# Patient Record
Sex: MALE | Race: WHITE | HISPANIC OR LATINO | Employment: FULL TIME | URBAN - METROPOLITAN AREA
[De-identification: names, ages, dates, MRNs, and addresses within clinical notes are randomized per-mention and may not be internally consistent; named-entity substitution may affect disease eponyms.]

---

## 2018-12-14 ENCOUNTER — OFFICE VISIT - HEALTHEAST (OUTPATIENT)
Dept: FAMILY MEDICINE | Facility: CLINIC | Age: 33
End: 2018-12-14

## 2018-12-14 DIAGNOSIS — J01.40 ACUTE NON-RECURRENT PANSINUSITIS: ICD-10-CM

## 2018-12-14 DIAGNOSIS — H66.002 ACUTE SUPPURATIVE OTITIS MEDIA OF LEFT EAR WITHOUT SPONTANEOUS RUPTURE OF TYMPANIC MEMBRANE, RECURRENCE NOT SPECIFIED: ICD-10-CM

## 2018-12-14 DIAGNOSIS — Z23 NEED FOR VACCINATION: ICD-10-CM

## 2021-06-02 VITALS — WEIGHT: 208.44 LBS

## 2021-06-22 NOTE — PROGRESS NOTES
ASSESSMENT/PLAN:       1. Acute non-recurrent pansinusitis    - amoxicillin-clavulanate (AUGMENTIN) 875-125 mg per tablet; Take 1 tablet by mouth 2 (two) times a day for 7 days.  Dispense: 14 tablet; Refill: 0  Other treatment options at home would include steam therapy, drinking lots of fluids and recommended use of nasal saline irrigation.  It is okay for him to use acetaminophen or ibuprofen for fever and discomfort    2. Need for vaccination    - Tdap vaccine,  8yo or older,  IM    3. Acute suppurative otitis media of left ear without spontaneous rupture of tympanic membrane, recurrence not specified  The Augmentin should be helpful to get rid of the ear infection on the left side as well.      Return to clinic if getting worse or not significantly improved in a week    Colin Brown MD      PROGRESS NOTE   12/14/2018    SUBJECTIVE:  Gualberto Farrell is a 33 y.o. male  who presents for   Chief Complaint   Patient presents with     Facial Pain     ran a fever 2 weeks ago, monday started to feel better then on tuesday has had facial pain, teeth pain, ear pain and yellow mucus    #1 respiratory infection 2 weeks duration now with sinus pain  About 2 weeks ago the patient had a sore throat and cough with some runny nose and sneezing.  Had some fever early on and the symptoms seem to be getting better and now over the last 24 hours fever has returned with pain into the upper teeth pain be behind his eyes with loss of taste and yellow nasal drainage.  The patient notices that pain to be worse with bending forward.  He has not had a problem with sinus infections in the past.  His sore throat and cough is resolved but he does have some fullness in the left ear.      There is no problem list on file for this patient.      Current Outpatient Medications   Medication Sig Dispense Refill     amoxicillin-clavulanate (AUGMENTIN) 875-125 mg per tablet Take 1 tablet by mouth 2 (two) times a day for 7 days. 14 tablet 0     No  current facility-administered medications for this visit.        Social History     Tobacco Use   Smoking Status Never Smoker   Smokeless Tobacco Never Used           OBJECTIVE:        No results found for this or any previous visit (from the past 240 hour(s)).    Vitals:    12/14/18 0920   BP: 124/88   Patient Position: Sitting   Cuff Size: Adult Large   Pulse: 87   Temp: 97.9  F (36.6  C)   SpO2: 97%   Weight: 208 lb 7 oz (94.5 kg)     Weight: 208 lb 7 oz (94.5 kg)          Physical Exam:  GENERAL APPEARANCE: 33-year-old male, NAD, well hydrated, well nourished  SKIN:  Normal skin turgor, no lesions/rashes   HEENT: moist mucous membranes,  yellow rhinorrhea with more congestion on the left side than the right.  The left ear is dull pink with loss of landmarks in the right eardrum is normal.  The patient has some tenderness to percussion over the maxillary sinuses.  NECK: Normal without adenopathy or masses  CV: RRR, no M/G/R   LUNGS: CTAB  ABDOMEN: S&NT, no masses or enlarged organs   EXTREMITY: no edema and full ROM of all joints  NEURO: no focal findings

## 2021-08-22 ENCOUNTER — HEALTH MAINTENANCE LETTER (OUTPATIENT)
Age: 36
End: 2021-08-22

## 2021-10-17 ENCOUNTER — HEALTH MAINTENANCE LETTER (OUTPATIENT)
Age: 36
End: 2021-10-17

## 2022-10-01 ENCOUNTER — HEALTH MAINTENANCE LETTER (OUTPATIENT)
Age: 37
End: 2022-10-01

## 2023-10-21 ENCOUNTER — HEALTH MAINTENANCE LETTER (OUTPATIENT)
Age: 38
End: 2023-10-21

## 2024-04-27 ENCOUNTER — HOSPITAL ENCOUNTER (OUTPATIENT)
Facility: CLINIC | Age: 39
Setting detail: OBSERVATION
Discharge: HOME | End: 2024-04-30
Attending: STUDENT IN AN ORGANIZED HEALTH CARE EDUCATION/TRAINING PROGRAM | Admitting: FAMILY MEDICINE
Payer: COMMERCIAL

## 2024-04-27 ENCOUNTER — APPOINTMENT (OUTPATIENT)
Dept: RADIOLOGY | Facility: CLINIC | Age: 39
End: 2024-04-27
Attending: EMERGENCY MEDICINE
Payer: COMMERCIAL

## 2024-04-27 DIAGNOSIS — I31.9 MYOPERICARDITIS: ICD-10-CM

## 2024-04-27 LAB
ATRIAL RATE - MUSE: 68 BPM
ATRIAL RATE - MUSE: 75 BPM
BASOPHILS # BLD MANUAL: 0 10E3/UL (ref 0–0.2)
BASOPHILS NFR BLD MANUAL: 0 %
D DIMER PPP FEU-MCNC: 1.57 UG/ML FEU (ref 0–0.5)
DIASTOLIC BLOOD PRESSURE - MUSE: NORMAL MMHG
DIASTOLIC BLOOD PRESSURE - MUSE: NORMAL MMHG
EOSINOPHIL # BLD MANUAL: 0 10E3/UL (ref 0–0.7)
EOSINOPHIL NFR BLD MANUAL: 0 %
ERYTHROCYTE [DISTWIDTH] IN BLOOD BY AUTOMATED COUNT: 13.7 % (ref 10–15)
HCT VFR BLD AUTO: 47 % (ref 40–53)
HGB BLD-MCNC: 16.5 G/DL (ref 13.3–17.7)
INTERPRETATION ECG - MUSE: NORMAL
INTERPRETATION ECG - MUSE: NORMAL
LYMPHOCYTES # BLD MANUAL: 1.9 10E3/UL (ref 0.8–5.3)
LYMPHOCYTES NFR BLD MANUAL: 18 %
MCH RBC QN AUTO: 29.3 PG (ref 26.5–33)
MCHC RBC AUTO-ENTMCNC: 35.1 G/DL (ref 31.5–36.5)
MCV RBC AUTO: 83 FL (ref 78–100)
MONOCYTES # BLD MANUAL: 1.4 10E3/UL (ref 0–1.3)
MONOCYTES NFR BLD MANUAL: 13 %
NEUTROPHILS # BLD MANUAL: 7.5 10E3/UL (ref 1.6–8.3)
NEUTROPHILS NFR BLD MANUAL: 69 %
NRBC # BLD AUTO: 0 10E3/UL
NRBC BLD AUTO-RTO: 0 /100
P AXIS - MUSE: 46 DEGREES
P AXIS - MUSE: 54 DEGREES
PLAT MORPH BLD: ABNORMAL
PLATELET # BLD AUTO: 270 10E3/UL (ref 150–450)
PR INTERVAL - MUSE: 140 MS
PR INTERVAL - MUSE: 152 MS
QRS DURATION - MUSE: 104 MS
QRS DURATION - MUSE: 108 MS
QT - MUSE: 378 MS
QT - MUSE: 394 MS
QTC - MUSE: 418 MS
QTC - MUSE: 422 MS
R AXIS - MUSE: 268 DEGREES
R AXIS - MUSE: 270 DEGREES
RBC # BLD AUTO: 5.64 10E6/UL (ref 4.4–5.9)
RBC MORPH BLD: ABNORMAL
SYSTOLIC BLOOD PRESSURE - MUSE: NORMAL MMHG
SYSTOLIC BLOOD PRESSURE - MUSE: NORMAL MMHG
T AXIS - MUSE: 50 DEGREES
T AXIS - MUSE: 54 DEGREES
VARIANT LYMPHS BLD QL SMEAR: PRESENT
VENTRICULAR RATE- MUSE: 68 BPM
VENTRICULAR RATE- MUSE: 75 BPM
WBC # BLD AUTO: 10.8 10E3/UL (ref 4–11)

## 2024-04-27 PROCEDURE — 93005 ELECTROCARDIOGRAM TRACING: CPT | Performed by: EMERGENCY MEDICINE

## 2024-04-27 PROCEDURE — 82248 BILIRUBIN DIRECT: CPT | Performed by: EMERGENCY MEDICINE

## 2024-04-27 PROCEDURE — 36415 COLL VENOUS BLD VENIPUNCTURE: CPT | Performed by: EMERGENCY MEDICINE

## 2024-04-27 PROCEDURE — 80053 COMPREHEN METABOLIC PANEL: CPT | Performed by: EMERGENCY MEDICINE

## 2024-04-27 PROCEDURE — 99285 EMERGENCY DEPT VISIT HI MDM: CPT | Mod: 25

## 2024-04-27 PROCEDURE — 71046 X-RAY EXAM CHEST 2 VIEWS: CPT

## 2024-04-27 PROCEDURE — 250N000011 HC RX IP 250 OP 636: Performed by: EMERGENCY MEDICINE

## 2024-04-27 PROCEDURE — 83690 ASSAY OF LIPASE: CPT | Performed by: EMERGENCY MEDICINE

## 2024-04-27 PROCEDURE — 96375 TX/PRO/DX INJ NEW DRUG ADDON: CPT

## 2024-04-27 PROCEDURE — 85379 FIBRIN DEGRADATION QUANT: CPT | Performed by: STUDENT IN AN ORGANIZED HEALTH CARE EDUCATION/TRAINING PROGRAM

## 2024-04-27 PROCEDURE — 85007 BL SMEAR W/DIFF WBC COUNT: CPT | Performed by: EMERGENCY MEDICINE

## 2024-04-27 PROCEDURE — 84484 ASSAY OF TROPONIN QUANT: CPT | Performed by: EMERGENCY MEDICINE

## 2024-04-27 PROCEDURE — 250N000013 HC RX MED GY IP 250 OP 250 PS 637: Performed by: STUDENT IN AN ORGANIZED HEALTH CARE EDUCATION/TRAINING PROGRAM

## 2024-04-27 PROCEDURE — 250N000011 HC RX IP 250 OP 636: Performed by: STUDENT IN AN ORGANIZED HEALTH CARE EDUCATION/TRAINING PROGRAM

## 2024-04-27 PROCEDURE — 85025 COMPLETE CBC W/AUTO DIFF WBC: CPT | Performed by: EMERGENCY MEDICINE

## 2024-04-27 PROCEDURE — 96374 THER/PROPH/DIAG INJ IV PUSH: CPT

## 2024-04-27 PROCEDURE — 83735 ASSAY OF MAGNESIUM: CPT | Performed by: EMERGENCY MEDICINE

## 2024-04-27 RX ORDER — MORPHINE SULFATE 4 MG/ML
4 INJECTION, SOLUTION INTRAMUSCULAR; INTRAVENOUS ONCE
Status: COMPLETED | OUTPATIENT
Start: 2024-04-27 | End: 2024-04-27

## 2024-04-27 RX ORDER — COLCHICINE 0.6 MG/1
0.6 TABLET ORAL ONCE
Status: COMPLETED | OUTPATIENT
Start: 2024-04-28 | End: 2024-04-27

## 2024-04-27 RX ORDER — KETOROLAC TROMETHAMINE 15 MG/ML
15 INJECTION, SOLUTION INTRAMUSCULAR; INTRAVENOUS ONCE
Status: COMPLETED | OUTPATIENT
Start: 2024-04-27 | End: 2024-04-27

## 2024-04-27 RX ORDER — ONDANSETRON 2 MG/ML
4 INJECTION INTRAMUSCULAR; INTRAVENOUS ONCE
Status: COMPLETED | OUTPATIENT
Start: 2024-04-27 | End: 2024-04-27

## 2024-04-27 RX ADMIN — COLCHICINE 0.6 MG: 0.6 TABLET ORAL at 23:59

## 2024-04-27 RX ADMIN — KETOROLAC TROMETHAMINE 15 MG: 15 INJECTION, SOLUTION INTRAMUSCULAR; INTRAVENOUS at 23:30

## 2024-04-27 RX ADMIN — FAMOTIDINE 40 MG: 10 INJECTION, SOLUTION INTRAVENOUS at 23:21

## 2024-04-27 RX ADMIN — MORPHINE SULFATE 4 MG: 4 INJECTION, SOLUTION INTRAMUSCULAR; INTRAVENOUS at 23:32

## 2024-04-27 RX ADMIN — ONDANSETRON 4 MG: 2 INJECTION INTRAMUSCULAR; INTRAVENOUS at 23:29

## 2024-04-27 ASSESSMENT — COLUMBIA-SUICIDE SEVERITY RATING SCALE - C-SSRS
2. HAVE YOU ACTUALLY HAD ANY THOUGHTS OF KILLING YOURSELF IN THE PAST MONTH?: NO
1. IN THE PAST MONTH, HAVE YOU WISHED YOU WERE DEAD OR WISHED YOU COULD GO TO SLEEP AND NOT WAKE UP?: NO
6. HAVE YOU EVER DONE ANYTHING, STARTED TO DO ANYTHING, OR PREPARED TO DO ANYTHING TO END YOUR LIFE?: NO

## 2024-04-27 ASSESSMENT — ACTIVITIES OF DAILY LIVING (ADL): ADLS_ACUITY_SCORE: 35

## 2024-04-27 NOTE — LETTER
[facility] HEART CARE  [address] [address]  Phone: [phone]  Fax: [phone]    April 29, 2024        Gualberto Farrell  [address] [address]          To whom it may concern:    RE: Gualberto Farrell was admitted to [facility] on 4/27/2024 and continues to be hospitalized.      {:[id number]}  Please contact me for questions or concerns.      Sincerely,    Radha Hutchinson RN      [facility], MD N[place]id

## 2024-04-27 NOTE — LETTER
[facility] HEART CARE  [address] [address]  Phone: [phone]  Fax: [phone]    April 30, 2024        Gualberto Farrell  [address] [address]          To whom it may concern:    RE: Gualber[place] Far[place] [facility] to April 30th, 2024.    Patient may return to work Monday May 6th, 2024    Please contact me for questions or concerns.      Sincerely,    Brandy Paulson RN, MD, MD

## 2024-04-27 NOTE — LETTER
[facility] HEART CARE  [address] [address]  Phone: [phone]  Fax: [phone]    April 30, 2024        Gualberto Farrell  [address] [address]          To whom it may concern:    RE: Gualberto Farrell    Patient may return to work *** with the following:  {No Restrictions or Light Duty List:[id number]}    Please contact me for questions or concerns.      Sincerely,      Brandy Haque MD, MD

## 2024-04-28 ENCOUNTER — APPOINTMENT (OUTPATIENT)
Dept: CARDIOLOGY | Facility: CLINIC | Age: 39
End: 2024-04-28
Attending: INTERNAL MEDICINE
Payer: COMMERCIAL

## 2024-04-28 ENCOUNTER — APPOINTMENT (OUTPATIENT)
Dept: CT IMAGING | Facility: CLINIC | Age: 39
End: 2024-04-28
Attending: STUDENT IN AN ORGANIZED HEALTH CARE EDUCATION/TRAINING PROGRAM
Payer: COMMERCIAL

## 2024-04-28 PROBLEM — I31.9 MYOPERICARDITIS: Status: ACTIVE | Noted: 2024-04-28

## 2024-04-28 LAB
ALBUMIN SERPL BCG-MCNC: 3.7 G/DL (ref 3.5–5.2)
ALBUMIN SERPL BCG-MCNC: 4.3 G/DL (ref 3.5–5.2)
ALBUMIN UR-MCNC: 100 MG/DL
ALP SERPL-CCNC: 81 U/L (ref 40–150)
ALP SERPL-CCNC: 95 U/L (ref 40–150)
ALT SERPL W P-5'-P-CCNC: 58 U/L (ref 0–70)
ALT SERPL W P-5'-P-CCNC: 61 U/L (ref 0–70)
AMORPH CRY #/AREA URNS HPF: ABNORMAL /HPF
ANION GAP SERPL CALCULATED.3IONS-SCNC: 15 MMOL/L (ref 7–15)
APPEARANCE UR: ABNORMAL
AST SERPL W P-5'-P-CCNC: 144 U/L (ref 0–45)
AST SERPL W P-5'-P-CCNC: 180 U/L (ref 0–45)
BILIRUB DIRECT SERPL-MCNC: <0.2 MG/DL (ref 0–0.3)
BILIRUB DIRECT SERPL-MCNC: <0.2 MG/DL (ref 0–0.3)
BILIRUB SERPL-MCNC: 0.5 MG/DL
BILIRUB SERPL-MCNC: 0.7 MG/DL
BILIRUB UR QL STRIP: NEGATIVE
BUN SERPL-MCNC: 10.6 MG/DL (ref 6–20)
CALCIUM SERPL-MCNC: 9.2 MG/DL (ref 8.6–10)
CHLORIDE SERPL-SCNC: 99 MMOL/L (ref 98–107)
COLOR UR AUTO: YELLOW
CREAT SERPL-MCNC: 0.89 MG/DL (ref 0.67–1.17)
DEPRECATED HCO3 PLAS-SCNC: 24 MMOL/L (ref 22–29)
EGFRCR SERPLBLD CKD-EPI 2021: >90 ML/MIN/1.73M2
FLUAV RNA SPEC QL NAA+PROBE: NEGATIVE
FLUBV RNA RESP QL NAA+PROBE: NEGATIVE
GLUCOSE SERPL-MCNC: 147 MG/DL (ref 70–99)
GLUCOSE UR STRIP-MCNC: NEGATIVE MG/DL
HGB UR QL STRIP: ABNORMAL
KETONES UR STRIP-MCNC: 40 MG/DL
LEUKOCYTE ESTERASE UR QL STRIP: NEGATIVE
LIPASE SERPL-CCNC: 28 U/L (ref 13–60)
LVEF ECHO: NORMAL
MAGNESIUM SERPL-MCNC: 2.5 MG/DL (ref 1.7–2.3)
MUCOUS THREADS #/AREA URNS LPF: PRESENT /LPF
NITRATE UR QL: NEGATIVE
PH UR STRIP: 6 [PH] (ref 5–7)
POTASSIUM SERPL-SCNC: 3.4 MMOL/L (ref 3.4–5.3)
PROT SERPL-MCNC: 7 G/DL (ref 6.4–8.3)
PROT SERPL-MCNC: 8 G/DL (ref 6.4–8.3)
RBC URINE: 2 /HPF
RSV RNA SPEC NAA+PROBE: NEGATIVE
SARS-COV-2 RNA RESP QL NAA+PROBE: NEGATIVE
SODIUM SERPL-SCNC: 138 MMOL/L (ref 135–145)
SP GR UR STRIP: 1.03 (ref 1–1.03)
SQUAMOUS EPITHELIAL: <1 /HPF
TROPONIN T SERPL HS-MCNC: 1845 NG/L
TROPONIN T SERPL HS-MCNC: 3369 NG/L
TROPONIN T SERPL HS-MCNC: 3611 NG/L
TROPONIN T SERPL HS-MCNC: 3619 NG/L
UROBILINOGEN UR STRIP-MCNC: <2 MG/DL
WBC URINE: 2 /HPF

## 2024-04-28 PROCEDURE — 99207 PR NO BILLABLE SERVICE THIS VISIT: CPT | Performed by: FAMILY MEDICINE

## 2024-04-28 PROCEDURE — 250N000013 HC RX MED GY IP 250 OP 250 PS 637: Performed by: INTERNAL MEDICINE

## 2024-04-28 PROCEDURE — 36415 COLL VENOUS BLD VENIPUNCTURE: CPT | Performed by: STUDENT IN AN ORGANIZED HEALTH CARE EDUCATION/TRAINING PROGRAM

## 2024-04-28 PROCEDURE — 99222 1ST HOSP IP/OBS MODERATE 55: CPT | Performed by: INTERNAL MEDICINE

## 2024-04-28 PROCEDURE — 84484 ASSAY OF TROPONIN QUANT: CPT | Performed by: STUDENT IN AN ORGANIZED HEALTH CARE EDUCATION/TRAINING PROGRAM

## 2024-04-28 PROCEDURE — 71275 CT ANGIOGRAPHY CHEST: CPT

## 2024-04-28 PROCEDURE — 258N000003 HC RX IP 258 OP 636: Performed by: STUDENT IN AN ORGANIZED HEALTH CARE EDUCATION/TRAINING PROGRAM

## 2024-04-28 PROCEDURE — 36415 COLL VENOUS BLD VENIPUNCTURE: CPT | Performed by: FAMILY MEDICINE

## 2024-04-28 PROCEDURE — 96376 TX/PRO/DX INJ SAME DRUG ADON: CPT

## 2024-04-28 PROCEDURE — 80076 HEPATIC FUNCTION PANEL: CPT | Performed by: FAMILY MEDICINE

## 2024-04-28 PROCEDURE — 81001 URINALYSIS AUTO W/SCOPE: CPT | Performed by: STUDENT IN AN ORGANIZED HEALTH CARE EDUCATION/TRAINING PROGRAM

## 2024-04-28 PROCEDURE — 99204 OFFICE O/P NEW MOD 45 MIN: CPT | Mod: 25 | Performed by: HOSPITALIST

## 2024-04-28 PROCEDURE — G0378 HOSPITAL OBSERVATION PER HR: HCPCS

## 2024-04-28 PROCEDURE — 93306 TTE W/DOPPLER COMPLETE: CPT

## 2024-04-28 PROCEDURE — 250N000013 HC RX MED GY IP 250 OP 250 PS 637: Performed by: HOSPITALIST

## 2024-04-28 PROCEDURE — 250N000011 HC RX IP 250 OP 636: Performed by: INTERNAL MEDICINE

## 2024-04-28 PROCEDURE — 84484 ASSAY OF TROPONIN QUANT: CPT | Performed by: FAMILY MEDICINE

## 2024-04-28 PROCEDURE — 87637 SARSCOV2&INF A&B&RSV AMP PRB: CPT | Performed by: HOSPITALIST

## 2024-04-28 PROCEDURE — 93306 TTE W/DOPPLER COMPLETE: CPT | Mod: 26 | Performed by: INTERNAL MEDICINE

## 2024-04-28 PROCEDURE — 96361 HYDRATE IV INFUSION ADD-ON: CPT

## 2024-04-28 PROCEDURE — 250N000011 HC RX IP 250 OP 636: Performed by: STUDENT IN AN ORGANIZED HEALTH CARE EDUCATION/TRAINING PROGRAM

## 2024-04-28 RX ORDER — PROCHLORPERAZINE MALEATE 10 MG
10 TABLET ORAL EVERY 6 HOURS PRN
Status: DISCONTINUED | OUTPATIENT
Start: 2024-04-28 | End: 2024-04-30 | Stop reason: HOSPADM

## 2024-04-28 RX ORDER — AMOXICILLIN 250 MG
1 CAPSULE ORAL 2 TIMES DAILY PRN
Status: DISCONTINUED | OUTPATIENT
Start: 2024-04-28 | End: 2024-04-30 | Stop reason: HOSPADM

## 2024-04-28 RX ORDER — COLCHICINE 0.6 MG/1
0.6 TABLET ORAL 2 TIMES DAILY
Status: DISCONTINUED | OUTPATIENT
Start: 2024-04-28 | End: 2024-04-30 | Stop reason: HOSPADM

## 2024-04-28 RX ORDER — IBUPROFEN 600 MG/1
600 TABLET, FILM COATED ORAL
Status: DISCONTINUED | OUTPATIENT
Start: 2024-04-28 | End: 2024-04-30 | Stop reason: HOSPADM

## 2024-04-28 RX ORDER — ACETAMINOPHEN 325 MG/1
650 TABLET ORAL EVERY 4 HOURS PRN
Status: DISCONTINUED | OUTPATIENT
Start: 2024-04-28 | End: 2024-04-30 | Stop reason: HOSPADM

## 2024-04-28 RX ORDER — ACETAMINOPHEN 650 MG/1
650 SUPPOSITORY RECTAL EVERY 4 HOURS PRN
Status: DISCONTINUED | OUTPATIENT
Start: 2024-04-28 | End: 2024-04-30 | Stop reason: HOSPADM

## 2024-04-28 RX ORDER — KETOROLAC TROMETHAMINE 30 MG/ML
30 INJECTION, SOLUTION INTRAMUSCULAR; INTRAVENOUS EVERY 6 HOURS PRN
Status: DISCONTINUED | OUTPATIENT
Start: 2024-04-28 | End: 2024-04-28

## 2024-04-28 RX ORDER — PROCHLORPERAZINE 25 MG
25 SUPPOSITORY, RECTAL RECTAL EVERY 12 HOURS PRN
Status: DISCONTINUED | OUTPATIENT
Start: 2024-04-28 | End: 2024-04-30 | Stop reason: HOSPADM

## 2024-04-28 RX ORDER — ONDANSETRON 2 MG/ML
4 INJECTION INTRAMUSCULAR; INTRAVENOUS EVERY 6 HOURS PRN
Status: DISCONTINUED | OUTPATIENT
Start: 2024-04-28 | End: 2024-04-30 | Stop reason: HOSPADM

## 2024-04-28 RX ORDER — ONDANSETRON 4 MG/1
4 TABLET, ORALLY DISINTEGRATING ORAL EVERY 6 HOURS PRN
Status: DISCONTINUED | OUTPATIENT
Start: 2024-04-28 | End: 2024-04-30 | Stop reason: HOSPADM

## 2024-04-28 RX ORDER — IOPAMIDOL 755 MG/ML
75 INJECTION, SOLUTION INTRAVASCULAR ONCE
Status: COMPLETED | OUTPATIENT
Start: 2024-04-28 | End: 2024-04-28

## 2024-04-28 RX ORDER — KETOROLAC TROMETHAMINE 15 MG/ML
15 INJECTION, SOLUTION INTRAMUSCULAR; INTRAVENOUS EVERY 6 HOURS PRN
Status: DISPENSED | OUTPATIENT
Start: 2024-04-28 | End: 2024-04-29

## 2024-04-28 RX ORDER — AMOXICILLIN 250 MG
2 CAPSULE ORAL 2 TIMES DAILY PRN
Status: DISCONTINUED | OUTPATIENT
Start: 2024-04-28 | End: 2024-04-30 | Stop reason: HOSPADM

## 2024-04-28 RX ORDER — MORPHINE SULFATE 4 MG/ML
3 INJECTION, SOLUTION INTRAMUSCULAR; INTRAVENOUS
Status: COMPLETED | OUTPATIENT
Start: 2024-04-28 | End: 2024-04-28

## 2024-04-28 RX ORDER — COLCHICINE 0.6 MG/1
0.6 TABLET ORAL 2 TIMES DAILY
Status: DISCONTINUED | OUTPATIENT
Start: 2024-04-28 | End: 2024-04-28

## 2024-04-28 RX ORDER — ACETAMINOPHEN 500 MG
500-1000 TABLET ORAL EVERY 6 HOURS PRN
COMMUNITY

## 2024-04-28 RX ORDER — COLCHICINE 0.6 MG/1
1.2 TABLET ORAL ONCE
Status: DISCONTINUED | OUTPATIENT
Start: 2024-04-28 | End: 2024-04-28

## 2024-04-28 RX ADMIN — IBUPROFEN 600 MG: 600 TABLET ORAL at 18:50

## 2024-04-28 RX ADMIN — MORPHINE SULFATE 3 MG: 4 INJECTION, SOLUTION INTRAMUSCULAR; INTRAVENOUS at 22:14

## 2024-04-28 RX ADMIN — IOPAMIDOL 75 ML: 755 INJECTION, SOLUTION INTRAVENOUS at 00:55

## 2024-04-28 RX ADMIN — SODIUM CHLORIDE, POTASSIUM CHLORIDE, SODIUM LACTATE AND CALCIUM CHLORIDE 1000 ML: 600; 310; 30; 20 INJECTION, SOLUTION INTRAVENOUS at 00:06

## 2024-04-28 RX ADMIN — COLCHICINE 0.6 MG: 0.6 TABLET ORAL at 08:10

## 2024-04-28 RX ADMIN — ACETAMINOPHEN 650 MG: 325 TABLET ORAL at 21:00

## 2024-04-28 ASSESSMENT — ACTIVITIES OF DAILY LIVING (ADL)
ADLS_ACUITY_SCORE: 35
ADLS_ACUITY_SCORE: 18
ADLS_ACUITY_SCORE: 18
ADLS_ACUITY_SCORE: 35
ADLS_ACUITY_SCORE: 18
ADLS_ACUITY_SCORE: 35
ADLS_ACUITY_SCORE: 35
ADLS_ACUITY_SCORE: 18
ADLS_ACUITY_SCORE: 35
ADLS_ACUITY_SCORE: 18
ADLS_ACUITY_SCORE: 18
ADLS_ACUITY_SCORE: 35
ADLS_ACUITY_SCORE: 18
ADLS_ACUITY_SCORE: 35

## 2024-04-28 NOTE — CONSULTS
[facility] Heart Care team is asked to see Gualberto Farrell in consultation to evaluate elevated trop 3611 .      Assessment:     Acute onset chest pain in 38-year-old gentleman with no cardiac risk factors, recent episode of fevers chills diarrhea, and ECG findings consistent with acute pericarditis.  Elevated troponin suggest myocardial involvement.  Good response to colchicine from a pain control standpoint, but onset of diarrhea suggests he may not tolerate it well long-term.  Will try ibuprofen 600 mg 3 times daily with meals  Myocarditis suggested by elevated troponin.  No clinical scenario to suggest acute coronary syndrome.  Likely viral etiology.  Normal ventricular function demonstrated on echo.     Plan:     Echocardiogram today shows no evidence of pericardial effusion, normal ventricular function and no significant valvular abnormalities.  Colchicine 0.6 mg twice daily for 1 month at least with gradual taper if tolerated.  Will decrease to once a day for the time being and give a trial of ibuprofen 600 mg 3 times daily with meals.    Stable for discharge in a.m. if pain controlled and diarrhea controlled     Current History:     Gualberto Farrell is a 38-year-old gentleman on no regular medications.  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  He also works 2 jobs which has been limiting his sleep.  1 week ago he ate some food that he believes then provoked diarrhea.  He had recurrent bouts of diarrhea over the next several days when he would occasionally eat suspected tainted food.  His wife also developed diarrhea during this timeframe.  He had no fevers chills or night sweats.  He felt weak, and had to call off of work.  He was able to help move a refrigerator on the 24th but felt very fatigued doing so.  It was not long after that he began to experience some upper substernal chest pain, worsened with breathing or with movement.  It resolved spontaneously at  "that time.  Yesterday this chest pain recurred, pressure sensation did not worsen with limited activity but breathing or movement would make it worse.  He was not short of breath or diaphoretic or nauseated.  He was still having intermittent bouts of diarrhea.  The chest discomfort increased in intensity causing him to the present to the emergency room.  In the emergency room ECG was suggestive of acute pericarditis.  He received colchicine with immediate improvement in his chest pain, with his second dose today he began experiencing watery diarrhea which is different from the other diarrhea he was experiencing.    Past Medical History:   No past medical history on file.  Sleep history: Limited by multiple jobs.  Good sleep last evening after hospitalization.  No snoring or apnea noted  Past Surgical History:   No past surgical history on file.    Family History:   No family history on file.  Family history reviewed and is not pertinent to the chief complaint or presenting problem    Social History:    reports that he has never smoked. He has never used smokeless tobacco. He reports current alcohol use of about 3.0 standard drinks of alcohol per week. He reports that he does not use drugs.  Exercise history: Heavy physical labor, stable activity tolerance.  Has a gym membership but has not had a chance to use it.    Meds:     No current outpatient medications on file.       Allergies:   Patient has no known allergies.    Review of Systems:   A 12 point comprehensive review of systems was negative except as noted in the current history.    Objective:      Physical Exam  Wt Readings from Last 3 Encounters:   04/27/24 101.2 kg (223 lb)   12/14/18 94.5 kg (208 lb 7 oz)     Body mass index is 32.93 kg/m .  BP 96/69   Pulse 85   Temp 98.9  F (37.2  C) (Oral)   Resp 10   Ht 1.753 m (5' 9\")   Wt 101.2 kg (223 lb)   SpO2 96%   BMI 32.93 kg/m      General Appearance:  No apparent distress.  Comfortable supine.  HEENT: " "No scleral icterus; the mucous membranes were pink and moist.  Conjunctiva not injected  Neck:  No cervical bruits, jugular venous distention, or thyromegaly.  Chest:The spine was straight. The chest was symmetric.  Lungs:  Respirations unlabored; the lungs are clear to auscultation.  Cardiovascular:     Normal point of maximal impulse. Auscultation reveals regular first and second heart sounds with no murmurs, rubs, or gallops. Carotid, radial, and dorsalis pedal pulses are intact and symmetric.  Abdomen:  No organomegaly, masses, bruits, or tenderness. Bowels sounds are present  Extremities: No cyanosis, clubbing, or edema.  Skin:  No xanthelasma.  Neurologic: Mood and affect are appropriate. Speech is fluent.      EKG (personally reviewed): 4/27/2024: Sinus rhythm 68 bpm.  Left anterior hemiblock.  Acute pericarditis with diffuse ST elevation.  Repeat study without significant change.    Imaging   CT chest PE run 4/27:  CORONARY ARTERY CALCIFICATION: None.  1.  No evidence of pulmonary embolus.      Cardiac diagnostics   Echocardiogram today  Left ventricular size, wall motion and function are normal. The ejection  fraction is 60-65%.  Normal right ventricle size and systolic function.  There is no pericardial effusion.  No hemodynamically significant valvular abnormalities on 2D or color flow  imaging.         Lab Results    Chemistry/lipid CBC Cardiac Enzymes/BNP/TSH/INR   No results for input(s): \"CHOL\", \"HDL\", \"LDL\", \"TRIG\", \"CHOLHDLRATIO\" in the last 85030 hours.  No results for input(s): \"LDL\" in the last 74315 hours.  Recent Labs   Lab Test 04/27/24  2323      POTASSIUM 3.4   CHLORIDE 99   CO2 24   *   BUN 10.6   CR 0.89   GFRESTIMATED >90   JULIANE 9.2     Recent Labs   Lab Test 04/27/24  2323   CR 0.89     No results for input(s): \"A1C\" in the last 83147 hours.       Recent Labs   Lab Test 04/27/24  2323   WBC 10.8   HGB 16.5   HCT 47.0   MCV 83        Recent Labs   Lab Test " "04/27/24  2323   HGB 16.5    No results for input(s): \"TROPONINI\" in the last 34483 hours.  No results for input(s): \"BNP\", \"NTBNPI\", \"NTBNP\" in the last 06936 hours.  No results for input(s): \"TSH\" in the last 25090 hours.  No results for input(s): \"INR\" in the last 90154 hours.         Weston Lang MD  [facility]  4/28/2024    Note created using Dragon voice recognition software.  Sound alike errors may have escaped editing.    Clinically Significant Risk Factors Present on Admission                  # Obesity: Estimated body mass index is 32.93 kg/m  as calculated from the following:    Height as of this encounter: 1.753 m (5' 9\").    Weight as of this encounter: 101.2 kg (223 lb).                                  "

## 2024-04-28 NOTE — PROGRESS NOTES
Patient is A&O X4, has been having soft BP's, other VSS  on RA. Denies pain, SOB, chest tightness, dizziness, constipation or nausea. Q2H neurochecks done. CMS intact. No pronator drift, Dorsiflexion good.   TELE reads Sinus Rhythm with nondescript T waves  DIET: Low fat, No caffeine  GI/: Had complained of difficulty urinating in the beginning of shift, he was bladder scanned with <70 ml found. Since midnight, he has been able to void spontaneously without difficulty.  Calls appropriately, ambulates standby to the bathroom. Wife is in room, spent the night.

## 2024-04-28 NOTE — ED TRIAGE NOTES
"Pt presents to ED with c/o chest pain x2 days.  Had \"food poisoning\" on Wednesday.  Reports fever x2 days.  Feeling short of breath.  Decreased urinary output.       Triage Assessment (Adult)       Row Name 04/27/24 7124          Triage Assessment    Airway WDL WDL        Respiratory WDL    Respiratory WDL X;rhythm/pattern     Rhythm/Pattern, Respiratory shortness of breath        Skin Circulation/Temperature WDL    Skin Circulation/Temperature WDL WDL        Cardiac WDL    Cardiac WDL X;chest pain        Chest Pain Assessment    Chest Pain Location midsternal        Peripheral/Neurovascular WDL    Peripheral Neurovascular WDL WDL        Cognitive/Neuro/Behavioral WDL    Cognitive/Neuro/Behavioral WDL WDL                     "

## 2024-04-28 NOTE — ED NOTES
Patient denies chest pain, shortness of breath states he is feeling much better since starting Colchicine.   Nelson Toth RN  4/28/2024  7:46 AM

## 2024-04-28 NOTE — ED PROVIDER NOTES
"  Emergency Department Encounter         FINAL IMPRESSION:  Myopericarditis         ED COURSE AND MEDICAL DECISION MAKING       ED Course as of 04/28/24 0134   Sat Apr 27, 2024   2309 EKG #1 sinus rhythm 75, no signs acute ischemia, no inversions no depressions no STEMI QTc is 422    EKG #2 as patient stated he started getting diaphoretic with chest pain,   2332 Discussed with cardiology who agrees that this EKG does suggest pericarditis.  Recommending NSAIDs and colchicine.   2349 Reevaluation, patient states his pain is now 2 out of 10.  Reports as well that he has been having difficulty urination.  Will do a bladder scan.   2350 Patient is a 2-year-old male with no chronic medical problems, here from home with anterior chest discomfort that began around 2 hours ago.  Patient states on Wednesday of this week he had an episode of \"food poisoning\" stating that he had some nausea, decreased p.o. and multiple episodes of diarrhea.  Intermittent subjective fevers.  States the symptoms improved however had an episode of chest pain yesterday the last 1 hour and now chest pain today.  No radiation to the back or abdomen.  No syncope.  Patient states when the pain gets bad he gets diaphoretic and nauseated.  Intermittent shortness of breath.  On arrival here he looks uncomfortable although nontoxic.  Vitals are stable.  Heart and lungs are normal.  Bedside echocardiogram showing no significant pericardial effusion.  EKGs as above.  Discussed case with cardiology.   2356 Bladder scan 67.  Added on urinalysis for dysuria.   -Plan for admission.  Discussed case with hospitalist.    Additional ED Course Timeline:  11:11 PM I met with the patient, obtained history, performed an initial exam, and discussed options and plan for diagnostics and treatment here in the ED.   11:21 PM I spoke with Dr. Fields, Cardiology  1:35 AM I Spoke with Dr. Anand, Hospitalist. We further discussed the patient's case, reviewed ED work-up so far, and " agreed to admit the patient.      Medical Decision Making  Obtained supplemental history:Supplemental history obtained?: Documented in chart  Reviewed external records: External records reviewed?: Documented in chart  Care impacted by chronic illness:N/A  Care significantly affected by social determinants of health:Access to Medical Care  Did you consider but not order tests?: Work up considered but not performed and documented in chart, if applicable  Did you interpret images independently?: Independent interpretation of ECG and images noted in documentation, when applicable.  Consultation discussion with other provider:Did you involve another provider (consultant, , pharmacy, etc.)?: I discussed the care with another health care provider, see documentation for details.  Admit.              Critical Care     Performed by: Quirino Mcdaniels or    Authorized by: Quirino Mcdaniels  Total critical care time:  minutes  Critical care was necessary to treat or prevent imminent or life-threatening deterioration of the following conditions:   Critical care was time spent personally by me on the following activities: development of treatment plan with patient or surrogate, discussions with consultants, examination of patient, evaluation of patient's response to treatment, obtaining history from patient or surrogate, ordering and performing treatments and interventions, ordering and review of laboratory studies, ordering and review of radiographic studies, re-evaluation of patient's condition and monitoring for potential decompensation.  Critical care time was exclusive of separately billable procedures and treating other patients.'    At the conclusion of the encounter I discussed the results of all the tests and the disposition. The questions were answered. The patient or family acknowledged understanding and was agreeable with the care plan.                  MEDICATIONS GIVEN IN THE EMERGENCY DEPARTMENT:  Medications   famotidine (PEPCID)  injection 40 mg (has no administration in time range)       NEW PRESCRIPTIONS STARTED AT TODAY'S ED VISIT:  New Prescriptions    No medications on file       HPI     Patient information obtained from: The patient    Use of : N/A     Gualberto Farrell is a 38 year old male with no pertinent history who presents to this ED via walk-in for evaluation of chest pain.    The patient developed a sudden onset of mid-chest pain and shortness of breath since 2 hours ago. He did have a brief episode of chest pain yesterday that resolved quickly. He reports having food poisoning on Wednesday (3 days ago) and has been having fevers, chills, and diarrhea. His GI symptoms has resolved at this time, but notes he is having urinary retention. He denies having any other personal medical history. She is not on any medication. No recent long travels.    Otherwise, the patient denied any other medical complaints or concerns at this time.          REVIEW OF SYSTEMS:  Review of Systems   Constitutional: Negative for fever, malaise  HEENT: Negative runny nose, sore throat, ear pain, neck pain  Respiratory: Negative for cough, congestion. Positive for shortness of breath.  Cardiovascular: Negative for leg edema. Positive for mid-chest pain.  Gastrointestinal: Negative for abdominal distention, abdominal pain, constipation, vomiting, nausea, diarrhea  Genitourinary: Negative for dysuria and hematuria.  Positive for urinary retention.  Integument: Negative for rash, skin breakdown  Neurological: Negative for paresthesias, weakness, headache.  Musculoskeletal: Negative for joint pain, joint swelling      All other systems reviewed and are negative.          MEDICAL HISTORY     No past medical history on file.    No past surgical history on file.    Social History     Tobacco Use    Smoking status: Never    Smokeless tobacco: Never   Substance Use Topics    Alcohol use: Yes     Alcohol/week: 3.0 standard drinks of alcohol    Drug use:  "No       No current outpatient medications on file.          PHYSICAL EXAM     /86   Pulse 74   Temp 97.3  F (36.3  C) (Temporal)   Resp 16   Ht 1.753 m (5' 9\")   Wt 101.2 kg (223 lb)   SpO2 98%   BMI 32.93 kg/m        PHYSICAL EXAM:     General: Patient appears well, nontoxic, comfortable  HEENT: Moist mucous membranes,  No head trauma.    Cardiovascular: Normal rate, normal rhythm, no extremity edema.  No appreciable murmur.  Respiratory: No signs of respiratory distress, lungs are clear to auscultation bilaterally with no wheezes rhonchi or rales.  Abdominal: Soft, nontender, nondistended, no palpable masses, no guarding, no rebound  Musculoskeletal: Full range of motion of joints, no deformities appreciated.  Neurological: Alert and oriented, grossly neurologically intact.  Psychological: Normal affect and mood.  Integument: No rashes appreciated          RESULTS       Labs Ordered and Resulted from Time of ED Arrival to Time of ED Departure - No data to display    XR Chest 2 Views    (Results Pending)                     PROCEDURES:  Procedures:  Procedures       I, Inderjit Patton am serving as a scribe to document services personally performed by Quirino Mcdaniels DO, based on my observations and the provider's statements to me.  IQuirino DO, attest that Inderjit Patton is acting in a scribe capacity, has observed my performance of the services and has documented them in accordance with my direction.    Quirino Mcdaniels DO  Emergency Medicine  [facility] EMERGENCY ROOM       Quirino Mcdaniels DO  04/28/24 0255    "

## 2024-04-28 NOTE — ED NOTES
Pt noting worsening pain, becoming diaphoretic with it.  Pt roomed to ED 21, 2nd ECG obtained and given to provider, SPreston Mcdaniels.

## 2024-04-28 NOTE — H&P
"[facility] MEDICINE ADMISSION HISTORY AND PHYSICAL     Brief Synopsis:     Gualberto Farrell is a 38 year old male who presented with complaints of chest pain, dyspnea, low urine output.    Medical history is notable for no significant chronic medical conditions.    Initial evaluation revealed nl vitals, troponin over 1800, elevated Ddimer. CT chest PE run neg for PE, infiltrates. Did show slight pericardial effusion. EKG with diffuse ST elevation consistent with pericarditis.    Initial treatment included colchicine, pepcid, toradol, LR, morphine, zofran.    Assessment and Plan:  Acute myopericarditis  Consult cardiology  Defer any further workup to cardiology  Trend troponin  Toradol prn  Scheduled colchicine    GI illness  Recent fever/chills, diarrhea  Symptoms now improved      Clinically Significant Risk Factors Present on Admission                       # Obesity: Estimated body mass index is 32.93 kg/m  as calculated from the following:    Height as of this encounter: 1.753 m (5' 9\").    Weight as of this encounter: 101.2 kg (223 lb).                DVTP: Mechanical Prophylaxis/ Sequential Compression Devices  Code Status: Full Code  Disposition: Observation   Diet: cardiac, no caffeine  Fluids: none    Disposition Plan      Expected Discharge Date: 04/29/2024               Chief Complaint Chest pain     HISTORY   Gualberto Farrell is a 38 year old male who presented with complaints of chest pain.    Per ED provider:  Gualberto Farrell is a 38 year old male with no pertinent history who presents to this ED via walk-in for evaluation of chest pain.     The patient developed a sudden onset of mid-chest pain and shortness of breath since 2 hours ago. He did have a brief episode of chest pain yesterday that resolved quickly. He reports having food poisoning on Wednesday (3 days ago) and has been having fevers, chills, and diarrhea. His GI symptoms has resolved at this time, but notes he " is having urinary retention. He denies having any other personal medical history. She is not on any medication. No recent long travels.     Otherwise, the patient denied any other medical complaints or concerns at this time.    Feeling much better at the time of my encounter. Had some trouble voiding but this has also resolved. No edema. No recent vaccination.     Past Medical History     No past medical history on file.     Surgical History   No past surgical history on file.  Family History    No family history on file.   Social History      Social History     Tobacco Use    Smoking status: Never    Smokeless tobacco: Never   Substance Use Topics    Alcohol use: Yes     Alcohol/week: 3.0 standard drinks of alcohol    Drug use: No      Allergies   No Known Allergies  Prior to Admission Medications      None      Review of Systems     A 12 point comprehensive review of systems was negative except as noted above in HPI.    PHYSICAL EXAMINATION     Vitals      Temp:  [97.3  F (36.3  C)] 97.3  F (36.3  C)  Pulse:  [63-83] 83  Resp:  [16-22] 17  BP: (110-131)/(74-88) 119/85  SpO2:  [94 %-100 %] 95 %    Examination   Physical Exam:    Gen: no acute distress, comfortable, alert, pleasant  ENT: no scleral icterus  Pulm: lungs are clear without wheezing, crackles, breathing comfortably at rest  CV: regular rate and rhythm, no significant lower extremity pitting edema, no murmur or rub.  Derm: Not pale, no jaundice  Psych: appropriate affect      Pertinent Radiology     Radiology Results:   Recent Results (from the past 24 hour(s))   XR Chest 2 Views    Narrative    EXAM: XR CHEST 2 VIEWS  LOCATION: [facility]  DATE: 4/27/2024    INDICATION: cp  COMPARISON: None.      Impression    IMPRESSION: Negative chest.   CT Chest Pulmonary Embolism w Contrast    Narrative    EXAM: CT CHEST PULMONARY EMBOLISM W CONTRAST  LOCATION: [facility]  DATE: 4/28/2024    INDICATION: chest pain,  dimer elevated, EKG suggestive of pericarditis  COMPARISON: None.  TECHNIQUE: CT chest pulmonary angiogram during arterial phase injection of IV contrast. Multiplanar reformats and MIP reconstructions were performed. Dose reduction techniques were used.   CONTRAST: Isovue 370 75mL    FINDINGS:  ANGIOGRAM CHEST: No evidence of pulmonary embolus. Thoracic aorta is not well opacified and is  indeterminate for dissection. No CT evidence of right heart strain.    LUNGS AND PLEURA: Mild dependent atelectasis. No pneumothorax, lobar consolidation or effusion.    MEDIASTINUM/AXILLAE: No significant mediastinal or hilar adenopathy. Trace pericardial effusion.    CORONARY ARTERY CALCIFICATION: None.    UPPER ABDOMEN: No acute abnormalities    MUSCULOSKELETAL: No acute bony abnormalities      Impression    IMPRESSION:  1.  No evidence of pulmonary embolus.     EKG Results: personally reviewed.  ST elevation in inferior, anterolateral leads    Dyllan Anand DO  [facility]   Phone: #[phone]

## 2024-04-28 NOTE — UTILIZATION REVIEW
"Inpatient to Observation note:    Admission Status; Secondary Review Determination     Under the authority of the Utilization Management Committee, the utilization review process indicated a secondary review on the above patient.  The review outcome is based on review of the medical records, discussions with staff, and applying clinical experience noted on the date of the review.          (x) Observation Status Appropriate - This patient does not meet hospital inpatient criteria and is placed in observation status. If this patient's primary payer is Medicare and was admitted as an inpatient, Condition Code 44 should be used and patient status changed to \"observation\".     RATIONALE FOR DETERMINATION     38 year old male with no significant past medical history.  Patient presented with complaints of chest pain, dyspnea, low urine output. Initial evaluation revealed nl vitals, troponin over 1845, elevated D-dimer. EKG with diffuse ST elevation consistent with pericarditis.  CT chest PE run neg for PE, infiltrates.  Patient is admitted for acute pericarditis.  Started on colchicine and Toradol.  Symptom has improved.      The severity of illness, intensity of service provided, expected LOS and risk for adverse outcome make the care appropriate for further observation; however, doesn't meet criteria for hospital inpatient admission. Dr Flores notified of this determination.        The information on this document is developed by the utilization review team in order for the business office to ensure compliance.  This only denotes the appropriateness of proper admission status and does not reflect the quality of care rendered.         The definitions of Inpatient Status and Observation Status used in making the determination above are those provided in the CMS Coverage Manual, Chapter 1 and Chapter 6, section 70.4.      Sincerely,  Brian Pittman MD  Utilization Review  Physician Advisor  [facility].   "

## 2024-04-28 NOTE — PROGRESS NOTES
[facility] MEDICINE  PROGRESS NOTE     Code Status: Full Code       Identification/Summary:   Gualberto Farrell is a 38 year old male with no significant past medical history.  4/27 presented with complaints of chest pain, dyspnea, low urine output.  Last week had some nausea vomiting diarrhea secondary to food poisoning but this has resolved.  Initial evaluation revealed nl vitals, troponin over 1845, elevated D-dimer. EKG with diffuse ST elevation consistent with pericarditis.  CT chest PE run neg for PE, infiltrates. Did show slight pericardial effusion.  Initially had some urine retention but this is resolved.  UA unremarkable.  Case discussed with cardiology.  Initiating treatment with colchicine and Toradol.  Patient feeling substantially better.  Further workup per cardiology.    Assessment and Plan:    Acute myopericarditis  Workup consistent with pericarditis.  Pleased to see good clinical improvement with colchicine and as needed Toradol.  Trend troponins 1845--> 3611.  Formal cardiology consult pending.     Nausea vomiting diarrhea-resolved  Occurred last week.  Likely associated with food poisoning.  No symptoms at this time.    Acute urinary retention-resolved  At time of ED arrival is having difficulty urinating.  Urinalysis unremarkable.  Urine output has now normalized.    Elevated AST  Initial .  Other LFTs normal.  Recheck hepatic panel.    Anticoagulation   Low Risk/Ambulatory with no VTE prophylaxis indicated    COVID-19 PCR/influenza A/B/RSV negative from 4/28/2024    Fluids: Saline lock  Pain meds: Tylenol and Toradol available as needed  Therapy: N/A  Espinoza:Not present  Lines: None       Current Diet  Orders Placed This Encounter      Combination Diet Low Saturated Fat Na <2400mg Diet, No Caffeine Diet    Supplements  None    Barriers to Discharge: Cardiology evaluation    Disposition: To be determined hopeful discharge either late today or  "tomorrow  Medically Ready for Discharge: Anticipated Tomorrow       Clinically Significant Risk Factors Present on Admission                       # Obesity: Estimated body mass index is 32.93 kg/m  as calculated from the following:    Height as of this encounter: 1.753 m (5' 9\").    Weight as of this encounter: 101.2 kg (223 lb).              Interval History/Subjective:  Patient doing well.  Chest pain has resolved since receiving colchicine and Toradol.  No nausea or vomiting.  No shortness of breath.  Was having urinary retention at ED arrival but this has since resolved.  Has never had anything like this before.  No known family history of heart disease.  Questions answered to verbalized satisfaction.        Physical Exam/Objective:  Temp:  [97.3  F (36.3  C)-98.9  F (37.2  C)] 98.9  F (37.2  C)  Pulse:  [63-83] 70  Resp:  [16-22] 17  BP: (100-131)/(70-88) 100/70  SpO2:  [94 %-100 %] 96 %  Wt Readings from Last 4 Encounters:   04/27/24 101.2 kg (223 lb)   12/14/18 94.5 kg (208 lb 7 oz)     Body mass index is 32.93 kg/m .    Constitutional: awake, alert, cooperative, no apparent distress, and appears stated age  ENT: Normocephalic, without obvious abnormality, atraumatic, external ears without lesions, oral pharynx with moist mucous membranes, tonsils without erythema or exudates, gums normal and good dentition.  Respiratory: No increased work of breathing, good air exchange, clear to auscultation bilaterally, no crackles or wheezing  Cardiovascular: Normal apical impulse, regular rate and rhythm, normal S1 and S2, no S3 or S4, and no murmur noted  GI: No scars, normal bowel sounds, soft, non-distended, non-tender, no masses palpated, no hepatosplenomegally  Skin: normal skin color, texture, turgor, no redness, warmth, or swelling, and no rashes  Musculoskeletal: There is no redness, warmth, or swelling of the joints.  Motor strength is 5 out of 5 all extremities bilaterally.  Tone is normal. no lower extremity " pitting edema present  Neurologic: Cranial nerves II-XII are grossly intact. Sensory:  Sensory intact  Neuropsychiatric: General: normal, calm, and normal eye contact Level of consciousness: alert / normal Affect: normal Orientation: oriented to self, place, time and situation Memory and insight: normal, memory for past and recent events intact, and thought process normal      Medications:   Personally Reviewed.  Medications   Current Facility-Administered Medications   Medication Dose Route Frequency Provider Last Rate Last Admin     Current Facility-Administered Medications   Medication Dose Route Frequency Provider Last Rate Last Admin    colchicine (COLCRYS) tablet 0.6 mg  0.6 mg Oral BID Dyllan Anand DO   0.6 mg at 04/28/24 0810       Data reviewed today: I personally reviewed all new medications, labs, imaging/diagnostics reports over the past 24 hours. Pertinent findings include:    Imaging:   Recent Results (from the past 24 hour(s))   XR Chest 2 Views    Narrative    EXAM: XR CHEST 2 VIEWS  LOCATION: [facility]  DATE: 4/27/2024    INDICATION: cp  COMPARISON: None.      Impression    IMPRESSION: Negative chest.   CT Chest Pulmonary Embolism w Contrast    Narrative    EXAM: CT CHEST PULMONARY EMBOLISM W CONTRAST  LOCATION: [facility]  DATE: 4/28/2024    INDICATION: chest pain, dimer elevated, EKG suggestive of pericarditis  COMPARISON: None.  TECHNIQUE: CT chest pulmonary angiogram during arterial phase injection of IV contrast. Multiplanar reformats and MIP reconstructions were performed. Dose reduction techniques were used.   CONTRAST: Isovue 370 75mL    FINDINGS:  ANGIOGRAM CHEST: No evidence of pulmonary embolus. Thoracic aorta is not well opacified and is  indeterminate for dissection. No CT evidence of right heart strain.    LUNGS AND PLEURA: Mild dependent atelectasis. No pneumothorax, lobar consolidation or effusion.    MEDIASTINUM/AXILLAE: No  significant mediastinal or hilar adenopathy. Trace pericardial effusion.    CORONARY ARTERY CALCIFICATION: None.    UPPER ABDOMEN: No acute abnormalities    MUSCULOSKELETAL: No acute bony abnormalities      Impression    IMPRESSION:  1.  No evidence of pulmonary embolus.       Labs:  CT Chest Pulmonary Embolism w Contrast   Final Result   IMPRESSION:   1.  No evidence of pulmonary embolus.      XR Chest 2 Views   Final Result   IMPRESSION: Negative chest.        Recent Results (from the past 24 hour(s))   ECG 12-LEAD WITH MUSE ([facility])    Collection Time: 04/27/24 10:42 PM   Result Value Ref Range    Systolic Blood Pressure  mmHg    Diastolic Blood Pressure  mmHg    Ventricular Rate 75 BPM    Atrial Rate 75 BPM    AL Interval 140 ms    QRS Duration 104 ms     ms    QTc 422 ms    P Axis 46 degrees    R AXIS 268 degrees    T Axis 50 degrees    Interpretation ECG       Sinus rhythm  Right superior axis deviation  Acute pericarditis  Abnormal ECG  No previous ECGs available  Confirmed by SEE ED PROVIDER NOTE FOR, ECG INTERPRETATION ([id number]),  GISEL BLAKE ([id number]) on 4/27/2024 10:46:15 PM     ECG 12-LEAD WITH MUSE ([facility])    Collection Time: 04/27/24 11:02 PM   Result Value Ref Range    Systolic Blood Pressure  mmHg    Diastolic Blood Pressure  mmHg    Ventricular Rate 68 BPM    Atrial Rate 68 BPM    AL Interval 152 ms    QRS Duration 108 ms     ms    QTc 418 ms    P Axis 54 degrees    R AXIS 270 degrees    T Axis 54 degrees    Interpretation ECG       Sinus rhythm  Left anterior fascicular block  Acute pericarditis  Abnormal ECG  When compared with ECG of 27-APR-2024 22:42,  No significant change was found  Confirmed by SEE ED PROVIDER NOTE FOR, ECG INTERPRETATION ([id number]),  GISEL BLAKE ([id number]) on 4/27/2024 11:17:59 PM     Basic metabolic panel    Collection Time: 04/27/24 11:23 PM   Result Value Ref Range    Sodium 138 135 - 145 mmol/L    Potassium 3.4 3.4 - 5.3 mmol/L    Chloride 99 98 - 107  mmol/L    Carbon Dioxide (CO2) 24 22 - 29 mmol/L    Anion Gap 15 7 - 15 mmol/L    Urea Nitrogen 10.6 6.0 - 20.0 mg/dL    Creatinine 0.89 0.67 - 1.17 mg/dL    GFR Estimate >90 >60 mL/min/1.73m2    Calcium 9.2 8.6 - 10.0 mg/dL    Glucose 147 (H) 70 - 99 mg/dL   Hepatic function panel    Collection Time: 04/27/24 11:23 PM   Result Value Ref Range    Protein Total 8.0 6.4 - 8.3 g/dL    Albumin 4.3 3.5 - 5.2 g/dL    Bilirubin Total 0.7 <=1.2 mg/dL    Alkaline Phosphatase 95 40 - 150 U/L     (H) 0 - 45 U/L    ALT 58 0 - 70 U/L    Bilirubin Direct <0.20 0.00 - 0.30 mg/dL   Lipase    Collection Time: 04/27/24 11:23 PM   Result Value Ref Range    Lipase 28 13 - 60 U/L   Troponin T, High Sensitivity    Collection Time: 04/27/24 11:23 PM   Result Value Ref Range    Troponin T, High Sensitivity 1,845 (HH) <=22 ng/L   Magnesium    Collection Time: 04/27/24 11:23 PM   Result Value Ref Range    Magnesium 2.5 (H) 1.7 - 2.3 mg/dL   D dimer quantitative    Collection Time: 04/27/24 11:23 PM   Result Value Ref Range    D-Dimer Quantitative 1.57 (H) 0.00 - 0.50 ug/mL FEU   CBC with platelets and differential    Collection Time: 04/27/24 11:23 PM   Result Value Ref Range    WBC Count 10.8 4.0 - 11.0 10e3/uL    RBC Count 5.64 4.40 - 5.90 10e6/uL    Hemoglobin 16.5 13.3 - 17.7 g/dL    Hematocrit 47.0 40.0 - 53.0 %    MCV 83 78 - 100 fL    MCH 29.3 26.5 - 33.0 pg    MCHC 35.1 31.5 - 36.5 g/dL    RDW 13.7 10.0 - 15.0 %    Platelet Count 270 150 - 450 10e3/uL    NRBCs per 100 WBC 0 <1 /100    Absolute NRBCs 0.0 10e3/uL   Manual Differential    Collection Time: 04/27/24 11:23 PM   Result Value Ref Range    % Neutrophils 69 %    % Lymphocytes 18 %    % Monocytes 13 %    % Eosinophils 0 %    % Basophils 0 %    Absolute Neutrophils 7.5 1.6 - 8.3 10e3/uL    Absolute Lymphocytes 1.9 0.8 - 5.3 10e3/uL    Absolute Monocytes 1.4 (H) 0.0 - 1.3 10e3/uL    Absolute Eosinophils 0.0 0.0 - 0.7 10e3/uL    Absolute Basophils 0.0 0.0 - 0.2 10e3/uL     RBC Morphology Confirmed RBC Indices     Platelet Assessment  Automated Count Confirmed. Platelet morphology is normal.     Automated Count Confirmed. Platelet morphology is normal.    Reactive Lymphocytes Present (A) None Seen   UA with Microscopic reflex to Culture    Collection Time: 04/28/24 12:35 AM    Specimen: Urine, Clean Catch   Result Value Ref Range    Color Urine Yellow Colorless, Straw, Light Yellow, Yellow    Appearance Urine Turbid (A) Clear    Glucose Urine Negative Negative mg/dL    Bilirubin Urine Negative Negative    Ketones Urine 40 (A) Negative mg/dL    Specific Gravity Urine 1.029 1.001 - 1.030    Blood Urine 0.2 mg/dL (A) Negative    pH Urine 6.0 5.0 - 7.0    Protein Albumin Urine 100 (A) Negative mg/dL    Urobilinogen Urine <2.0 <2.0 mg/dL    Nitrite Urine Negative Negative    Leukocyte Esterase Urine Negative Negative    Mucus Urine Present (A) None Seen /LPF    Amorphous Crystals Urine Few (A) None Seen /HPF    RBC Urine 2 <=2 /HPF    WBC Urine 2 <=5 /HPF    Squamous Epithelials Urine <1 <=1 /HPF   Troponin T, High Sensitivity    Collection Time: 04/28/24  1:28 AM   Result Value Ref Range    Troponin T, High Sensitivity 3,611 (HH) <=22 ng/L   Symptomatic Influenza A/B, RSV, & SARS-CoV2 PCR (COVID-19) Nasopharyngeal    Collection Time: 04/28/24  2:26 AM    Specimen: Nasopharyngeal; Swab   Result Value Ref Range    Influenza A PCR Negative Negative    Influenza B PCR Negative Negative    RSV PCR Negative Negative    SARS CoV2 PCR Negative Negative       Pending Labs:  Unresulted Labs Ordered in the Past 30 Days of this Admission       No orders found for last 31 day(s).              Moragn Flores MD  [facility]  [facility]  Phone: #[phone]

## 2024-04-28 NOTE — PHARMACY-ADMISSION MEDICATION HISTORY
Pharmacist Admission Medication History    Admission medication history is complete. The information provided in this note is only as accurate as the sources available at the time of the update.    Information Source(s): Patient, Clinic records, and CareEverywhere/SureScripts via in-person    Pertinent Information: Patient confirms that he does not take any prescription medications, vitamins/supplements, eye drops, inhalers, or creams. Patient only takes prn tylenol for mild pain episodes.    Changes made to PTA medication list:  Added: tylenol  Deleted: None  Changed: None    Allergies reviewed with patient and updates made in EHR: yes    Medication History Completed By: Geovanny Guy [facility] 4/28/2024 9:38 AM    PTA Med List   Medication Sig Last Dose    acetaminophen (TYLENOL) 500 MG tablet Take 500-1,000 mg by mouth every 6 hours as needed for mild pain PRN

## 2024-04-29 LAB
ALT SERPL W P-5'-P-CCNC: 68 U/L (ref 0–70)
ANION GAP SERPL CALCULATED.3IONS-SCNC: 8 MMOL/L (ref 7–15)
BUN SERPL-MCNC: 11.6 MG/DL (ref 6–20)
CALCIUM SERPL-MCNC: 8.6 MG/DL (ref 8.6–10)
CHLORIDE SERPL-SCNC: 104 MMOL/L (ref 98–107)
CREAT SERPL-MCNC: 0.84 MG/DL (ref 0.67–1.17)
DEPRECATED HCO3 PLAS-SCNC: 29 MMOL/L (ref 22–29)
EGFRCR SERPLBLD CKD-EPI 2021: >90 ML/MIN/1.73M2
ERYTHROCYTE [DISTWIDTH] IN BLOOD BY AUTOMATED COUNT: 14.1 % (ref 10–15)
GLUCOSE SERPL-MCNC: 93 MG/DL (ref 70–99)
HCT VFR BLD AUTO: 45.3 % (ref 40–53)
HGB BLD-MCNC: 15.5 G/DL (ref 13.3–17.7)
MCH RBC QN AUTO: 29.1 PG (ref 26.5–33)
MCHC RBC AUTO-ENTMCNC: 34.2 G/DL (ref 31.5–36.5)
MCV RBC AUTO: 85 FL (ref 78–100)
PLATELET # BLD AUTO: 295 10E3/UL (ref 150–450)
POTASSIUM SERPL-SCNC: 3.9 MMOL/L (ref 3.4–5.3)
RBC # BLD AUTO: 5.32 10E6/UL (ref 4.4–5.9)
SODIUM SERPL-SCNC: 141 MMOL/L (ref 135–145)
TROPONIN T SERPL HS-MCNC: 3160 NG/L
TROPONIN T SERPL HS-MCNC: 3335 NG/L
TROPONIN T SERPL HS-MCNC: 4013 NG/L
WBC # BLD AUTO: 10.3 10E3/UL (ref 4–11)

## 2024-04-29 PROCEDURE — 250N000013 HC RX MED GY IP 250 OP 250 PS 637: Performed by: INTERNAL MEDICINE

## 2024-04-29 PROCEDURE — 99213 OFFICE O/P EST LOW 20 MIN: CPT | Performed by: INTERNAL MEDICINE

## 2024-04-29 PROCEDURE — G0378 HOSPITAL OBSERVATION PER HR: HCPCS

## 2024-04-29 PROCEDURE — 80048 BASIC METABOLIC PNL TOTAL CA: CPT | Performed by: INTERNAL MEDICINE

## 2024-04-29 PROCEDURE — 85041 AUTOMATED RBC COUNT: CPT | Performed by: INTERNAL MEDICINE

## 2024-04-29 PROCEDURE — 84460 ALANINE AMINO (ALT) (SGPT): CPT | Performed by: INTERNAL MEDICINE

## 2024-04-29 PROCEDURE — 36415 COLL VENOUS BLD VENIPUNCTURE: CPT | Performed by: INTERNAL MEDICINE

## 2024-04-29 PROCEDURE — 84484 ASSAY OF TROPONIN QUANT: CPT | Mod: 91 | Performed by: INTERNAL MEDICINE

## 2024-04-29 PROCEDURE — 99233 SBSQ HOSP IP/OBS HIGH 50: CPT | Performed by: INTERNAL MEDICINE

## 2024-04-29 RX ADMIN — IBUPROFEN 600 MG: 600 TABLET ORAL at 08:49

## 2024-04-29 RX ADMIN — IBUPROFEN 600 MG: 600 TABLET ORAL at 17:44

## 2024-04-29 RX ADMIN — IBUPROFEN 600 MG: 600 TABLET ORAL at 13:19

## 2024-04-29 RX ADMIN — COLCHICINE 0.6 MG: 0.6 TABLET ORAL at 20:16

## 2024-04-29 RX ADMIN — COLCHICINE 0.6 MG: 0.6 TABLET ORAL at 08:49

## 2024-04-29 ASSESSMENT — ACTIVITIES OF DAILY LIVING (ADL)
ADLS_ACUITY_SCORE: 18

## 2024-04-29 NOTE — PLAN OF CARE
Problem: Chest Pain  Goal: Resolution of Chest Pain Symptoms  Outcome: Progressing     Problem: Gas Exchange Impaired  Goal: Optimal Gas Exchange  Outcome: Progressing   Goal Outcome Evaluation:  Pt denies presence of chest pain or any discomfort this morning; denies nausea and says he's hungry.  On room air; lungs clear and up independently in room, denies SOB.  Medicated with scheduled Cholichine and Ibuprofen; will monitor for chest pain/ nausea/ vomiting / diarrhea.

## 2024-04-29 NOTE — PROGRESS NOTES
Assessment/Plan:  1.  Acute pan-myopericarditis: The patient had viral infection 1 week ago.  He developed chest pain.  His ECG was consistent with acute pericarditis.  Hyposensitivity troponin was significantly elevated.  Chest CTA showed no calcification.  The patient was diagnosed with acute pan myopericarditis.  He responded to ibuprofen 600 mg 3 times daily, continue 0.6 mg twice a day.  The patient had viral infection one week ago, no fever or chest pain now.  Antiviral infection may not be beneficial at this time.  The patient's hyposensitivity troponin is still going up from 3619 to 4013.  Continue to follow-up with his troponins.  Consider cardiac MRI study at the [facility] for the diagnosis and risk stratification if his troponins continue to going up.    Continue to observe until his troponin I going down.    Subjective:  Interval History: The patient states that his chest pain is resolved.  He has no shortness of breath, palpitations.  No orthopnea.  Telemetry monitor did not record PVCs, nonsustained ventricular tachycardia.    Current Medications:  Scheduled Meds:  Current Facility-Administered Medications   Medication Dose Route Frequency Provider Last Rate Last Admin    colchicine (COLCRYS) tablet 0.6 mg  0.6 mg Oral BID Weston Lang MD   0.6 mg at 04/29/24 0849    ibuprofen (ADVIL/MOTRIN) tablet 600 mg  600 mg Oral TID w/meals Weston Lang MD   600 mg at 04/29/24 0849     Continuous Infusions:  Current Facility-Administered Medications   Medication Dose Route Frequency Provider Last Rate Last Admin     PRN Meds:.  Current Facility-Administered Medications   Medication Dose Route Frequency Provider Last Rate Last Admin    acetaminophen (TYLENOL) tablet 650 mg  650 mg Oral Q4H PRN Dyllan Anand DO   650 mg at 04/28/24 2100    Or    acetaminophen (TYLENOL) Suppository 650 mg  650 mg Rectal Q4H PRN Dyllan Anand DO        melatonin tablet 5 mg  5 mg Oral At Bedtime PRN Kika  Dyllan BELL DO        ondansetron (ZOFRAN ODT) ODT tab 4 mg  4 mg Oral Q6H PRN Dyllan Anand DO        Or    ondansetron (ZOFRAN) injection 4 mg  4 mg Intravenous Q6H PRN Dyllan Anand DO        prochlorperazine (COMPAZINE) injection 10 mg  10 mg Intravenous Q6H PRN Dyllan Anand DO        Or    prochlorperazine (COMPAZINE) tablet 10 mg  10 mg Oral Q6H PRN Dyllan Anand DO        Or    prochlorperazine (COMPAZINE) suppository 25 mg  25 mg Rectal Q12H PRN Dyllan Anand DO        senna-docusate (SENOKOT-S/PERICOLACE) 8.6-50 MG per tablet 1 tablet  1 tablet Oral BID PRDyllan Teresa DO        Or    senna-docusate (SENOKOT-S/PERICOLACE) 8.6-50 MG per tablet 2 tablet  2 tablet Oral BID PRDyllan Teresa DO           Objective:   Vital signs in last 24 hours:  Temp:  [97.6  F (36.4  C)-98.6  F (37  C)] 97.9  F (36.6  C)  Pulse:  [58-88] 80  Resp:  [12-29] 24  BP: ()/(66-98) 121/86  SpO2:  [92 %-99 %] 98 %  Weight:   [unfilled]     Physical Exam:  General Appearance:   Awake, Alert, No acute distress.   HEENT:  No scleral icterus; the mucous membranes were moist.   Neck: No cervical bruits. No jugular venous distention   Lungs:   Lungs are clear to auscultation. No crackles. No wheezing.   Cardiovascular:   RRR, normal first and second heart sounds with no murmurs. No rubs or gallops.     Abdomen:  Soft. No tenderness. Bowels sounds are present   Extremities: No leg edema. Equal posterior tibial pulsese.   Skin: Warm, Dry. No rashes.   Neurologic: Mood and affect are appropriate. No focal deficits.         Cardiographics:   Report: personally reviewed. .      Tele monitoring -sinus rhythm, no cardiac arrhythmia overnight monitor.    Echocardiogram on April 28, 2024:  Left ventricular size, wall motion and function are normal. The ejection  fraction is 60-65%.  Normal right ventricle size and systolic function.  There is no pericardial effusion.  No hemodynamically significant valvular abnormalities on 2D  "or color flow  imaging.      Lab Results:   personally reviewed.     Lab Results   Component Value Date     04/29/2024    CO2 29 04/29/2024    BUN 11.6 04/29/2024     No results found for: \"CKTOTAL\", \"CKMB\", \"TROPONINI\"  Lab Results   Component Value Date    WBC 10.3 04/29/2024    HGB 15.5 04/29/2024    HCT 45.3 04/29/2024    MCV 85 04/29/2024     04/29/2024     No results found for: \"CHOL\", \"TRIG\", \"HDL\", \"LDL\", \"LDLDIRECT\"        "

## 2024-04-29 NOTE — PROGRESS NOTES
[facility] MEDICINE  PROGRESS NOTE     Code Status: Full Code       Identification/Summary:   Gualberto Farrell is a 38 year old male with no significant past medical history.  4/27 presented with complaints of chest pain, dyspnea, low urine output.  Last week had some nausea vomiting diarrhea secondary to food poisoning but this has resolved.  Initial evaluation revealed nl vitals, troponin over 1845, elevated D-dimer. EKG with diffuse ST elevation consistent with pericarditis.  CT chest PE run neg for PE, infiltrates. Did show slight pericardial effusion.  Initially had some urine retention but this is resolved.  UA unremarkable.  Case discussed with cardiology.  Initiating treatment with colchicine and Toradol.  Patient feeling substantially better.  Further workup per cardiology.    Assessment and Plan:    Acute pan myopericarditis  Workup consistent with pericarditis.Chest CTA showed no calcification.   Clinically: Doing better, chest pain has improved.  However troponin continues to trend up, now up to 4000.    Cardiology consulted.  Appreciate input, following  Per cardiology:   He responded to ibuprofen 600 mg 3 times daily, continue 0.6 mg twice a day.  The patient had viral infection one week ago, no fever or chest pain now.  Antiviral infection may not be beneficial at this time.  The patient's hyposensitivity troponin is still going up from 3619 to 4013.  Continue to follow-up with his troponins.  Consider cardiac MRI study at the [facility] for the diagnosis and risk stratification if his troponins continue to going up.     Continue to observe until his troponin I going down.  Continue to trend troponin  Continue cardiac telemetry       Nausea vomiting diarrhea-resolved  Occurred last week.  Likely associated with food poisoning.  No symptoms at this time.  Monitor for recurrence when on colchicine    Acute urinary retention-resolved  At time of ED arrival is  "having difficulty urinating.  Urinalysis unremarkable.  Urine output has now normalized.    Elevated AST  Initial .  Other LFTs normal.  Follow-up ALT 68.    Anticoagulation   Low Risk/Ambulatory with no VTE prophylaxis indicated    COVID-19 PCR/influenza A/B/RSV negative from 4/28/2024    Fluids: Saline lock  Pain meds: Tylenol and Toradol available as needed  Therapy: N/A  Espinoza:Not present  Lines: None       Current Diet  Orders Placed This Encounter      Combination Diet Low Saturated Fat Na <2400mg Diet, No Caffeine Diet    Supplements  None    Barriers to Discharge: Cardiology evaluation    Disposition: To be determined likely tomorrow.  Medically Ready for Discharge: Anticipated Tomorrow       Clinically Significant Risk Factors Present on Admission                       # Obesity: Estimated body mass index is 31.44 kg/m  as calculated from the following:    Height as of this encounter: 1.753 m (5' 9\").    Weight as of this encounter: 96.6 kg (212 lb 14.4 oz).              Interval History/Subjective:  Patient doing well.  Chest pain has resolved since receiving colchicine and Toradol.  No nausea or vomiting.  No shortness of breath.  Was having urinary retention at ED arrival but this has since resolved.  Has never had anything like this before.  No known family history of heart disease.  Questions answered to verbalized satisfaction.      Last 24H PRN:     acetaminophen (TYLENOL) tablet 650 mg, 650 mg at 04/28/24 2100 **OR** acetaminophen (TYLENOL) Suppository 650 mg    Physical Exam/Objective:  Temp:  [97.6  F (36.4  C)-98.6  F (37  C)] 98  F (36.7  C)  Pulse:  [58-88] 77  Resp:  [13-29] 25  BP: ()/(66-98) 117/72  SpO2:  [92 %-99 %] 95 %  Wt Readings from Last 4 Encounters:   04/29/24 96.6 kg (212 lb 14.4 oz)   12/14/18 94.5 kg (208 lb 7 oz)     Body mass index is 31.44 kg/m .    General Appearance: Awake, interactive, NAD  HEENT: AT/NC, Anicteric, Moist MM  Neck: Supple.   Respiratory: Normal " work of breathing. CTA BL.   Cardiovascular: S1 S2 Regular.  GI/Abd: Soft. NT. ND. No g/r.   Extremities: Distally wwp. No pedal edema.  Neuro: AO x 4, Grossly non focal.   Skin: No acute rash on exposed areas.   Psychiatry: Stable mood.       Medications:   Personally Reviewed.  Medications   Current Facility-Administered Medications   Medication Dose Route Frequency Provider Last Rate Last Admin     Current Facility-Administered Medications   Medication Dose Route Frequency Provider Last Rate Last Admin    colchicine (COLCRYS) tablet 0.6 mg  0.6 mg Oral BID Weston Lang MD   0.6 mg at 04/29/24 0849    ibuprofen (ADVIL/MOTRIN) tablet 600 mg  600 mg Oral TID w/meals Weston Lang MD   600 mg at 04/29/24 1319       Data reviewed today: I personally reviewed all new medications, labs, imaging/diagnostics reports over the past 24 hours. Pertinent findings include:    Imaging:   No results found for this or any previous visit (from the past 24 hour(s)).      Labs:  Echocardiogram Complete   Final Result      CT Chest Pulmonary Embolism w Contrast   Final Result   IMPRESSION:   1.  No evidence of pulmonary embolus.      XR Chest 2 Views   Final Result   IMPRESSION: Negative chest.        Recent Results (from the past 24 hour(s))   Troponin T, High Sensitivity    Collection Time: 04/29/24  8:59 AM   Result Value Ref Range    Troponin T, High Sensitivity 4,013 (HH) <=22 ng/L   CBC with platelets    Collection Time: 04/29/24  8:59 AM   Result Value Ref Range    WBC Count 10.3 4.0 - 11.0 10e3/uL    RBC Count 5.32 4.40 - 5.90 10e6/uL    Hemoglobin 15.5 13.3 - 17.7 g/dL    Hematocrit 45.3 40.0 - 53.0 %    MCV 85 78 - 100 fL    MCH 29.1 26.5 - 33.0 pg    MCHC 34.2 31.5 - 36.5 g/dL    RDW 14.1 10.0 - 15.0 %    Platelet Count 295 150 - 450 10e3/uL   Basic metabolic panel    Collection Time: 04/29/24  8:59 AM   Result Value Ref Range    Sodium 141 135 - 145 mmol/L    Potassium 3.9 3.4 - 5.3 mmol/L    Chloride 104 98 -  107 mmol/L    Carbon Dioxide (CO2) 29 22 - 29 mmol/L    Anion Gap 8 7 - 15 mmol/L    Urea Nitrogen 11.6 6.0 - 20.0 mg/dL    Creatinine 0.84 0.67 - 1.17 mg/dL    GFR Estimate >90 >60 mL/min/1.73m2    Calcium 8.6 8.6 - 10.0 mg/dL    Glucose 93 70 - 99 mg/dL   ALT    Collection Time: 04/29/24  8:59 AM   Result Value Ref Range    ALT 68 0 - 70 U/L       Pending Labs:  Unresulted Labs Ordered in the Past 30 Days of this Admission       No orders found from 3/28/2024 to 4/28/2024.            Nahid Varma MD  [facility]  Contact information available via [facility] Paging/Directory

## 2024-04-29 NOTE — CARE PLAN
Shift Events:     Pt. Reported 2/10 chest pain, Provider notified, medication ordered. PRN medication given for pain. Upon pain re-check Pt. Reported 10/10 chest pain One time dose morpion given (Seee MAR). Pt. Reported cessation of pain shortly after medication administration.      Assessment:     Neuro: A/ox4, follows commands, Afebrile    Respiratory: RA- 1L While asleep    Cardiovascular: Tele SR w/ BBB    GI/: Voiding via Toilet. Diet: Cardiac. No BMs this shift.    Lines/Drains: L) AC PIV    Pain: Pt. Reporting Chest pain, PRN medications given for pain (See MAR).       Problem: Chest Pain  Goal: Resolution of Chest Pain Symptoms  Outcome: Progressing     Problem: Gas Exchange Impaired  Goal: Optimal Gas Exchange  Outcome: Progressing  Intervention: Optimize Oxygenation and Ventilation  Recent Flowsheet Documentation  Taken 4/29/2024 0400 by Mcihelle Ruff RN  Head of Bed (HOB) Positioning: HOB at 20-30 degrees  Taken 4/29/2024 0000 by Michelle Ruff RN  Head of Bed (HOB) Positioning: HOB at 20-30 degrees  Taken 4/28/2024 2000 by Michelle Ruff RN  Head of Bed (HOB) Positioning: HOB at 20-30 degrees

## 2024-04-29 NOTE — PLAN OF CARE
Problem: Adult Inpatient Plan of Care  Goal: Readiness for Transition of Care  Outcome: Progressing     Problem: Chest Pain  Goal: Resolution of Chest Pain Symptoms  4/29/2024 1351 by Radha Hutchinson, RN  Outcome: Progressing  4/29/2024 0927 by Radha Hutchinson, RN  Outcome: Progressing     Problem: Gas Exchange Impaired  Goal: Optimal Gas Exchange  Outcome: Progressing   Goal Outcome Evaluation:  Pt has been pain free this shift; denies SOB, no nausea and tolerated 2mg sodium diet well.  Remains on room air.  Pt had every elevated Troponin this morning of 4,013;  Dr Varma notified ; received orders for serum troponins q 6hr; unsure of discharge date; will monitor for episodes of chest pain, cardiace eKG changes and serum labs.  Updated pt's wife .

## 2024-04-30 VITALS
RESPIRATION RATE: 23 BRPM | OXYGEN SATURATION: 97 % | DIASTOLIC BLOOD PRESSURE: 75 MMHG | TEMPERATURE: 98.5 F | HEIGHT: 69 IN | HEART RATE: 74 BPM | BODY MASS INDEX: 31.84 KG/M2 | WEIGHT: 215 LBS | SYSTOLIC BLOOD PRESSURE: 110 MMHG

## 2024-04-30 DIAGNOSIS — I40.0 ACUTE VIRAL MYOCARDITIS: Primary | ICD-10-CM

## 2024-04-30 LAB
TROPONIN T SERPL HS-MCNC: 2375 NG/L
TROPONIN T SERPL HS-MCNC: 2658 NG/L

## 2024-04-30 PROCEDURE — G0378 HOSPITAL OBSERVATION PER HR: HCPCS

## 2024-04-30 PROCEDURE — 36415 COLL VENOUS BLD VENIPUNCTURE: CPT | Performed by: INTERNAL MEDICINE

## 2024-04-30 PROCEDURE — 99239 HOSP IP/OBS DSCHRG MGMT >30: CPT | Performed by: INTERNAL MEDICINE

## 2024-04-30 PROCEDURE — 250N000013 HC RX MED GY IP 250 OP 250 PS 637: Performed by: INTERNAL MEDICINE

## 2024-04-30 PROCEDURE — 84484 ASSAY OF TROPONIN QUANT: CPT | Performed by: INTERNAL MEDICINE

## 2024-04-30 RX ORDER — COLCHICINE 0.6 MG/1
0.6 TABLET ORAL 2 TIMES DAILY
Qty: 20 TABLET | Refills: 0 | Status: SHIPPED | OUTPATIENT
Start: 2024-04-30 | End: 2024-05-10

## 2024-04-30 RX ORDER — IBUPROFEN 600 MG/1
600 TABLET, FILM COATED ORAL
Qty: 21 TABLET | Refills: 0 | Status: SHIPPED | OUTPATIENT
Start: 2024-04-30 | End: 2024-05-07

## 2024-04-30 RX ADMIN — IBUPROFEN 600 MG: 600 TABLET ORAL at 09:04

## 2024-04-30 RX ADMIN — COLCHICINE 0.6 MG: 0.6 TABLET ORAL at 09:04

## 2024-04-30 ASSESSMENT — ACTIVITIES OF DAILY LIVING (ADL)
ADLS_ACUITY_SCORE: 18

## 2024-04-30 NOTE — DISCHARGE SUMMARY
"[facility]  Hospitalist Discharge Summary      Date of Admission:  4/27/2024  Date of Discharge:  4/30/2024  Discharging Provider: Brandy Haque MD  Discharge Service: Hospitalist Service    Discharge Diagnoses       Clinically Significant Risk Factors     # Obesity: Estimated body mass index is 31.75 kg/m  as calculated from the following:    Height as of this encounter: 1.753 m (5' 9\").    Weight as of this encounter: 97.5 kg (215 lb).       Follow-ups Needed After Discharge   Follow-up Appointments     Follow-up and recommended labs and tests       Follow up with primary care provider, Provider Not In System, within 7   days for hospital follow- up.  The following labs/tests are recommended:   cardiac MRI.  Follow up with cardiology as scheduled.            Unresulted Labs Ordered in the Past 30 Days of this Admission       No orders found from 3/28/2024 to 4/28/2024.            Discharge Disposition   Discharged to home  Condition at discharge: Stable    Hospital Course      Gualberto Farrell is a 38 year old male with no significant past medical history.  4/27 presented with complaints of chest pain, dyspnea, low urine output.  Last week had some nausea vomiting diarrhea secondary to food poisoning but this has resolved.  Initial evaluation revealed nl vitals, troponin over 1845, elevated D-dimer. EKG with diffuse ST elevation consistent with pericarditis.  CT chest PE run neg for PE, infiltrates. Did show slight pericardial effusion.  Initially had some urine retention but this is resolved.  UA unremarkable.  Case discussed with cardiology.  Initiating treatment with colchicine and Toradol.  Patient feeling substantially better.  Further workup per cardiology.    Assessment and Plan:     Acute pan myopericarditis  Workup consistent with pericarditis.Chest CTA showed no calcification.   Clinically: Doing better, chest pain has improved.  However troponin continues to trend up, now up " to 4000.     Cardiology consulted.  Appreciate input, following  Per cardiology:   He responded to ibuprofen 600 mg 3 times daily, continue 0.6 mg twice a day.  The patient had viral infection one week ago, no fever or chest pain now.  Antiviral infection may not be beneficial at this time.  The patient's hyposensitivity troponin is still going up from 3619 to 4013.  Continue to follow-up with his troponins.  Consider cardiac MRI study at the [facility] for the diagnosis and risk stratification if his troponins continue to going up.     Continue to observe until his troponin I going down.  Continue to trend troponin  Continue cardiac telemetry        Nausea vomiting diarrhea-resolved  Occurred last week.  Likely associated with food poisoning.  No symptoms at this time.  Monitor for recurrence when on colchicine     Acute urinary retention-resolved  At time of ED arrival is having difficulty urinating.  Urinalysis unremarkable.  Urine output has now normalized.     Elevated AST  Initial .  Other LFTs normal.  Follow-up ALT 68.     Anticoagulation   Low Risk/Ambulatory with no VTE prophylaxis indicated      Afebrile, not hypoxic. No chest pain/SOB. Likely can discharge home with PO NSAID and colchicine and follow up with PCP/cardiology for cardiac MRI.    Consultations This Hospital Stay   CARDIOLOGY IP CONSULT    Code Status   Full Code    Time Spent on this Encounter   I, Brandy Haque MD, personally saw the patient today and spent greater than 30 minutes discharging this patient.       Brandy Haque MD  [facility] HEART [facility]ARE  [address] [address]  Phone: [phone]  Fax: [phone]  ______________________________________________________________________    Physical Exam   Vital Signs: Temp: 98.5  F (36.9  C) Temp src: Oral BP: 110/75 Pulse: 68   Resp: 24 SpO2: 97 % O2 Device: None (Room air)    Weight: 215 lbs 0 oz    Gen - AAO x 3 in  NAD.  Lungs - CTA B.  Heart - RR,S1+S2 nml, no m/g/r. No pericardial friction rub.  Abd - soft, NT, ND, + BS.  Ext - no edema.         Primary Care Physician   Provider Not In System    Discharge Orders      Follow-up and recommended labs and tests     Follow up with primary care provider, Provider Not In System, within 7 days for hospital follow- up.  The following labs/tests are recommended: cardiac MRI.  Follow up with cardiology as scheduled.     Activity    Your activity upon discharge: activity as tolerated     Diet    Follow this diet upon discharge: Regular       Significant Results and Procedures   Results for orders placed or performed during the hospital encounter of 04/27/24   XR Chest 2 Views    Narrative    EXAM: XR CHEST 2 VIEWS  LOCATION: [facility]  DATE: 4/27/2024    INDICATION: cp  COMPARISON: None.      Impression    IMPRESSION: Negative chest.   CT Chest Pulmonary Embolism w Contrast    Narrative    EXAM: CT CHEST PULMONARY EMBOLISM W CONTRAST  LOCATION: [facility]  DATE: 4/28/2024    INDICATION: chest pain, dimer elevated, EKG suggestive of pericarditis  COMPARISON: None.  TECHNIQUE: CT chest pulmonary angiogram during arterial phase injection of IV contrast. Multiplanar reformats and MIP reconstructions were performed. Dose reduction techniques were used.   CONTRAST: Isovue 370 75mL    FINDINGS:  ANGIOGRAM CHEST: No evidence of pulmonary embolus. Thoracic aorta is not well opacified and is  indeterminate for dissection. No CT evidence of right heart strain.    LUNGS AND PLEURA: Mild dependent atelectasis. No pneumothorax, lobar consolidation or effusion.    MEDIASTINUM/AXILLAE: No significant mediastinal or hilar adenopathy. Trace pericardial effusion.    CORONARY ARTERY CALCIFICATION: None.    UPPER ABDOMEN: No acute abnormalities    MUSCULOSKELETAL: No acute bony abnormalities      Impression    IMPRESSION:  1.  No evidence of pulmonary  embolus.   Echocardiogram Complete     Value    LVEF  60-65%    [facility]    [id number]  [id number]  [id number]  [id number]^EASTON^ELADIO^[place] [address]     Name: CELIA LOPEZ  MRN: [id number]  : 1985  Study Date: 2024 10:55 AM  Age: 38 yrs  Gender: Male  Patient Location: [facility]  Reason For Study: Acute Pericarditis  Ordering Physician: ELADIO MCCORMACK  Performed By: AGUEDA     BSA: 2.1 m2  Height: 69 in  Weight: 211 lb  HR: 75  BP: 94/61 mmHg  ______________________________________________________________________________  ______________________________________________________________________________  Interpretation Summary     Left ventricular size, wall motion and function are normal. The ejection  fraction is 60-65%.  Normal right ventricle size and systolic function.  There is no pericardial effusion.  No hemodynamically significant valvular abnormalities on 2D or color flow  imaging.  ______________________________________________________________________________  Left Ventricle  Left ventricular size, wall motion and function are normal. The ejection  fraction is 60-65%. There is normal left ventricular wall thickness. Left  ventricular diastolic function is normal. No regional wall motion  abnormalities noted.     Right Ventricle  Normal right ventricle size and systolic function. TAPSE is normal, which is  consistent with normal right ventricular systolic function.     Atria  The left atrium is mildly dilated. Right atrial size is normal. There is no  color Doppler evidence of an atrial shunt.     Mitral Valve  Mitral valve leaflets appear normal. There is no evidence of mitral stenosis  or clinically significant mitral regurgitation.     Tricuspid Valve  Tricuspid valve leaflets appear normal. Right ventricle systolic pressure  estimate normal. There is mild (1+) tricuspid regurgitation.     Aortic Valve  Aortic valve leaflets appear normal. There  is no evidence of aortic stenosis  or clinically significant aortic regurgitation.     Pulmonic Valve  The pulmonic valve is not well seen, but is grossly normal.     Vessels  The aorta root is normal. Normal size ascending aorta. IVC diameter <2.1 cm  collapsing >50% with sniff suggests a normal RA pressure of 3 mmHg.     Pericardium  There is no pericardial effusion.     ______________________________________________________________________________  MMode/2D Measurements & Calculations  IVSd: 1.2 cm  LVIDd: 4.4 cm  LVIDs: 3.0 cm  LVPWd: 1.2 cm  FS: 33.4 %     LV mass(C)d: 187.6 grams  LV mass(C)dI: 88.9 grams/m2  Ao root diam: 3.5 cm  asc Aorta Diam: 3.5 cm  LVOT diam: 2.1 cm  LVOT area: 3.5 cm2  Ao root diam index Ht(cm/m): 2.0  Ao root diam index BSA (cm/m2): 1.7  Asc Ao diam index BSA (cm/m2): 1.7  Asc Ao diam index Ht(cm/m): 2.0  EF Biplane: 57.6 %  LA Volume (BP): 71.5 ml     LA Volume Indexed (AL/bp): 35.8 ml/m2  RWT: 0.54  TAPSE: 1.7 cm     Time Measurements  MM HR: 78.0 BPM     Doppler Measurements & Calculations  MV E max mitul: 64.7 cm/sec  MV A max mitul: 47.3 cm/sec  MV E/A: 1.4  MV dec slope: 312.0 cm/sec2  MV dec time: 0.21 sec  Ao V2 max: 124.0 cm/sec  Ao max P.0 mmHg  Ao V2 mean: 86.9 cm/sec  Ao mean PG: 3.0 mmHg  Ao V2 VTI: 24.9 cm  LEYDI(I,D): 2.7 cm2  LEYDI(V,D): 2.7 cm2  LV V1 max PG: 3.7 mmHg  LV V1 max: 96.3 cm/sec  LV V1 VTI: 19.7 cm  SV(LVOT): 68.2 ml  SI(LVOT): 32.3 ml/m2  PA acc time: 0.09 sec  TR max mitul: 242.0 cm/sec  TR max P.4 mmHg  Pulm Sys Mitul: 53.9 cm/sec  Pulm Scott Mitul: 41.1 cm/sec  Pulm A Revs Mitul: 23.6 cm/sec  Pulm S/D: 1.3     AV Mitul Ratio (DI): 0.78  LEYDI Index (cm2/m2): 1.3  E/E': 7.0  E/E' av.0  Lateral E/e': 8.9  Medial E/e': 7.0  Peak E' Mitul: 9.3 cm/sec  RV S Mitul: 7.9 cm/sec     ______________________________________________________________________________  Report approved by: Anahi Mendenhall 2024 03:15 PM             Discharge Medications   Current  Discharge Medication List        START taking these medications    Details   colchicine (COLCRYS) 0.6 MG tablet Take 1 tablet (0.6 mg) by mouth 2 times daily for 10 days  Qty: 20 tablet, Refills: 0    Associated Diagnoses: Myopericarditis      ibuprofen (ADVIL/MOTRIN) 600 MG tablet Take 1 tablet (600 mg) by mouth 3 times daily (with meals) for 7 days  Qty: 21 tablet, Refills: 0    Associated Diagnoses: Myopericarditis           CONTINUE these medications which have NOT CHANGED    Details   acetaminophen (TYLENOL) 500 MG tablet Take 500-1,000 mg by mouth every 6 hours as needed for mild pain           Allergies   No Known Allergies

## 2024-04-30 NOTE — PROGRESS NOTES
PRIMARY DIAGNOSIS: CHEST PAIN  OUTPATIENT/OBSERVATION GOALS TO BE MET BEFORE DISCHARGE:  1. Ruled out acute coronary syndrome (negative or stable Troponin):  Yes  2. Pain Status: Pain free.  3. Appropriate provocative testing performed: Yes  - Stress Test Procedure: N/A  - Interpretation of cardiac rhythm per telemetry tech: NSR    4. Cleared by Consultants (if applicable):Yes  5. Return to near baseline physical activity: Yes  Discharge Planner Nurse   Safe discharge environment identified: Yes  Barriers to discharge: No       Entered by: Angela Farrar RN 04/30/2024 5:42 AM   VSS on RA. A&ox4. Ind. Denies chest pain and nausea. Reports loose stools. No acute changes.   Please review provider order for any additional goals.   Nurse to notify provider when observation goals have been met and patient is ready for discharge.

## 2024-04-30 NOTE — PROGRESS NOTES
Pt discharged home with wife; ambulatory and escorted by PCA to front door; see discharge AVS for further information

## 2024-04-30 NOTE — PROGRESS NOTES
PRIMARY DIAGNOSIS: CHEST PAIN  OUTPATIENT/OBSERVATION GOALS TO BE MET BEFORE DISCHARGE:  1. Ruled out acute coronary syndrome (negative or stable Troponin):  Yes  2. Pain Status: Pain free.  3. Appropriate provocative testing performed: Yes  - Stress Test Procedure: N/A  - Interpretation of cardiac rhythm per telemetry tech: NSR    4. Cleared by Consultants (if applicable):Yes  5. Return to near baseline physical activity: Yes  Discharge Planner Nurse   Safe discharge environment identified: Yes  Barriers to discharge: No       Entered by: Angela Farrar RN 04/30/2024 5:37 AM   VSS on RA. A&ox4. Ind. Denies chest pain and nausea. Reports loose stools. No acute changes.   Please review provider order for any additional goals.   Nurse to notify provider when observation goals have been met and patient is ready for discharge.

## 2024-04-30 NOTE — PROGRESS NOTES
Care Management Discharge Note    Discharge Date: 04/30/2024       Discharge Disposition: Home    Discharge Services: None    Discharge DME: None    Discharge Transportation: family or friend will provide    Private pay costs discussed: Not applicable    Does the patient's insurance plan have a 3 day qualifying hospital stay waiver?  No    PAS Confirmation Code:    Patient/family educated on Medicare website which has current facility and service quality ratings: no    Education Provided on the Discharge Plan: Yes (AVS per bedside RN)  Persons Notified of Discharge Plans: pt and wife  Patient/Family in Agreement with the Plan: yes    Handoff Referral Completed: Yes    Additional Information:  Pt to discharge back to home with wife. Wife to transport.     Pt to follow up with PCP and Cardiology.    No CM needs for discharge.     Khurram Cherry RN

## 2024-04-30 NOTE — PROGRESS NOTES
PRIMARY DIAGNOSIS: CHEST PAIN  OUTPATIENT/OBSERVATION GOALS TO BE MET BEFORE DISCHARGE:  1. Ruled out acute coronary syndrome (negative or stable Troponin):  Yes  2. Pain Status: Pain free.  3. Appropriate provocative testing performed: Yes  - Stress Test Procedure: N/A  - Interpretation of cardiac rhythm per telemetry tech: NSR    4. Cleared by Consultants (if applicable):Yes  5. Return to near baseline physical activity: Yes  Discharge Planner Nurse   Safe discharge environment identified: Yes  Barriers to discharge: No       Entered by: Angela Farrar RN 04/30/2024 5:41 AM   VSS on RA. A&ox4. Ind. Denies chest pain and nausea. Reports loose stools. No acute changes.   Please review provider order for any additional goals.   Nurse to notify provider when observation goals have been met and patient is ready for discharge.

## 2024-05-06 ENCOUNTER — OFFICE VISIT (OUTPATIENT)
Dept: FAMILY MEDICINE | Facility: CLINIC | Age: 39
End: 2024-05-06
Payer: COMMERCIAL

## 2024-05-06 VITALS
RESPIRATION RATE: 16 BRPM | HEART RATE: 85 BPM | TEMPERATURE: 98.5 F | DIASTOLIC BLOOD PRESSURE: 76 MMHG | WEIGHT: 216.1 LBS | OXYGEN SATURATION: 96 % | SYSTOLIC BLOOD PRESSURE: 118 MMHG | HEIGHT: 69 IN | BODY MASS INDEX: 32.01 KG/M2

## 2024-05-06 DIAGNOSIS — I31.9 MYOPERICARDITIS: Primary | ICD-10-CM

## 2024-05-06 DIAGNOSIS — R29.818 SUSPECTED SLEEP APNEA: ICD-10-CM

## 2024-05-06 PROCEDURE — 99203 OFFICE O/P NEW LOW 30 MIN: CPT | Performed by: NURSE PRACTITIONER

## 2024-05-06 ASSESSMENT — PAIN SCALES - GENERAL: PAINLEVEL: NO PAIN (0)

## 2024-05-06 NOTE — LETTER
May 6, 2024      Gualberto Farrell  [address] [place] [address]        To Whom It May Concern:    Gualberto Farrell was seen in our clinic. He may return to full duties at work without restrictions.            Sincerely,          Krystyna Jack, CNP

## 2024-05-06 NOTE — PROGRESS NOTES
"  Hanny Burciaga is a 38 year old, presenting for the following health issues:  Hospital F/U ([facility] 04/27/2024 - 04/30/2024 - Brandy Beth MD, Quirino Mcdaniels DO - diagnosis Myopericarditis - Patient needs note from provider to go back to work.  Full Duty No Restrictions.   Fax [phone])      5/6/2024     2:15 PM   Additional Questions   Roomed by  LPN     HPI   Acute pan myopericarditis     Hospital Follow-up Visit:    Hospital/Nursing Home/IP Rehab Facility: [facility]  Date of Admission: 4/27/24  Date of Discharge: 4/30/24  Reason(s) for Admission:   Was the patient in the ICU or did the patient experience delirium during hospitalization?  No  Do you have any other stressors you would like to discuss with your provider? Financial Concerns    Problems taking medications regularly:  None  Medication changes since discharge: None  Problems adhering to non-medication therapy:  None    Summary of hospitalization:  [facility] discharge summary reviewed  Diagnostic Tests/Treatments reviewed.  Follow up needed: MRI scheduled  Other Healthcare Providers Involved in Patient s Care:         Specialist appointment - cardiology   Update since discharge: improved.         Plan of care communicated with patient           Would like note to return to work.  .  Feels ready to return to work without restrictions.     Review of Systems  Constitutional, neuro, ENT, endocrine, pulmonary, cardiac, gastrointestinal, genitourinary, musculoskeletal, integument and psychiatric systems are negative, except as otherwise noted.      Objective    /76 (BP Location: Left arm, Patient Position: Sitting, Cuff Size: Adult Regular)   Pulse 85   Temp 98.5  F (36.9  C) (Oral)   Resp 16   Ht 1.753 m (5' 9\")   Wt 98 kg (216 lb 1.6 oz)   SpO2 96%   BMI 31.91 kg/m    Body mass index is 31.91 kg/m .  Physical Exam   GENERAL: alert and no distress  RESP: lungs clear to " auscultation - no rales, rhonchi or wheezes  CV: regular rate and rhythm, normal S1 S2, no S3 or S4, no murmur, click or rub, no peripheral edema   MS: no gross musculoskeletal defects noted, no edema  NEURO: Normal strength and tone, mentation intact and speech normal  PSYCH: mentation appears normal, affect normal/bright    Admission on 04/27/2024, Discharged on 04/30/2024   Component Date Value Ref Range Status    Ventricular Rate 04/27/2024 75  BPM Final    Atrial Rate 04/27/2024 75  BPM Final    WA Interval 04/27/2024 140  ms Final    QRS Duration 04/27/2024 104  ms Final    QT 04/27/2024 378  ms Final    QTc 04/27/2024 422  ms Final    P Axis 04/27/2024 46  degrees Final    R AXIS 04/27/2024 268  degrees Final    T Axis 04/27/2024 50  degrees Final    Interpretation ECG 04/27/2024    Final                    Value:Sinus rhythm  Right superior axis deviation  Acute pericarditis  Abnormal ECG  No previous ECGs available  Confirmed by SEE ED PROVIDER NOTE FOR, ECG INTERPRETATION ([id number]),  GISEL BLAKE ([id number]) on 4/27/2024 10:46:15 PM      Sodium 04/27/2024 138  135 - 145 mmol/L Final    Reference intervals for this test were updated on 09/26/2023 to more accurately reflect our healthy population. There may be differences in the flagging of prior results with similar values performed with this method. Interpretation of those prior results can be made in the context of the updated reference intervals.     Potassium 04/27/2024 3.4  3.4 - 5.3 mmol/L Final    Chloride 04/27/2024 99  98 - 107 mmol/L Final    Carbon Dioxide (CO2) 04/27/2024 24  22 - 29 mmol/L Final    Anion Gap 04/27/2024 15  7 - 15 mmol/L Final    Urea Nitrogen 04/27/2024 10.6  6.0 - 20.0 mg/dL Final    Creatinine 04/27/2024 0.89  0.67 - 1.17 mg/dL Final    GFR Estimate 04/27/2024 >90  >60 mL/min/1.73m2 Final    Calcium 04/27/2024 9.2  8.6 - 10.0 mg/dL Final    Glucose 04/27/2024 147 (H)  70 - 99 mg/dL Final    Protein Total 04/27/2024 8.0   6.4 - 8.3 g/dL Final    Albumin 04/27/2024 4.3  3.5 - 5.2 g/dL Final    Bilirubin Total 04/27/2024 0.7  <=1.2 mg/dL Final    Alkaline Phosphatase 04/27/2024 95  40 - 150 U/L Final    Reference intervals for this test were updated on 11/14/2023 to more accurately reflect our healthy population. There may be differences in the flagging of prior results with similar values performed with this method. Interpretation of those prior results can be made in the context of the updated reference intervals.    AST 04/27/2024 144 (H)  0 - 45 U/L Final    Reference intervals for this test were updated on 6/12/2023 to more accurately reflect our healthy population. There may be differences in the flagging of prior results with similar values performed with this method. Interpretation of those prior results can be made in the context of the updated reference intervals.    ALT 04/27/2024 58  0 - 70 U/L Final    Reference intervals for this test were updated on 6/12/2023 to more accurately reflect our healthy population. There may be differences in the flagging of prior results with similar values performed with this method. Interpretation of those prior results can be made in the context of the updated reference intervals.      Bilirubin Direct 04/27/2024 <0.20  0.00 - 0.30 mg/dL Final    Lipase 04/27/2024 28  13 - 60 U/L Final    Troponin T, High Sensitivity 04/27/2024 1,845 (HH)  <=22 ng/L Final    Either a High Sensitivity Troponin T baseline (0 hours) value = 100 ng/L, or an increase in High Sensitivity Troponin T = 7 ng/L at 2 hours compared to 0 hours (2-0 hours), suggests myocardial injury, and urgent clinical attention is required.    If the 2-0 hours increase is <7 ng/L, a High Sensitivity Troponin T result above gender-specific reference ranges warrants further evaluation.   Recommendations for further evaluation include correlation with clinical decision-making tool (e.g., HEART), a 3rd High Sensitivity Troponin T test 2  hours after the 2nd (a 20% change from baseline would represent concern), admission for observation, close PCC/cardiology follow-up, or urgent outpatient provocative testing.    Magnesium 04/27/2024 2.5 (H)  1.7 - 2.3 mg/dL Final    Ventricular Rate 04/27/2024 68  BPM Final    Atrial Rate 04/27/2024 68  BPM Final    ID Interval 04/27/2024 152  ms Final    QRS Duration 04/27/2024 108  ms Final    QT 04/27/2024 394  ms Final    QTc 04/27/2024 418  ms Final    P Axis 04/27/2024 54  degrees Final    R AXIS 04/27/2024 270  degrees Final    T Axis 04/27/2024 54  degrees Final    Interpretation ECG 04/27/2024    Final                    Value:Sinus rhythm  Left anterior fascicular block  Acute pericarditis  Abnormal ECG  When compared with ECG of 27-APR-2024 22:42,  No significant change was found  Confirmed by SEE ED PROVIDER NOTE FOR, ECG INTERPRETATION ([id number]),  GISEL BLAKE ([id number]) on 4/27/2024 11:17:59 PM      D-Dimer Quantitative 04/27/2024 1.57 (H)  0.00 - 0.50 ug/mL FEU Final    WBC Count 04/27/2024 10.8  4.0 - 11.0 10e3/uL Final    RBC Count 04/27/2024 5.64  4.40 - 5.90 10e6/uL Final    Hemoglobin 04/27/2024 16.5  13.3 - 17.7 g/dL Final    Hematocrit 04/27/2024 47.0  40.0 - 53.0 % Final    MCV 04/27/2024 83  78 - 100 fL Final    MCH 04/27/2024 29.3  26.5 - 33.0 pg Final    MCHC 04/27/2024 35.1  31.5 - 36.5 g/dL Final    RDW 04/27/2024 13.7  10.0 - 15.0 % Final    Platelet Count 04/27/2024 270  150 - 450 10e3/uL Final    NRBCs per 100 WBC 04/27/2024 0  <1 /100 Final    Absolute NRBCs 04/27/2024 0.0  10e3/uL Final    % Neutrophils 04/27/2024 69  % Final    % Lymphocytes 04/27/2024 18  % Final    % Monocytes 04/27/2024 13  % Final    % Eosinophils 04/27/2024 0  % Final    % Basophils 04/27/2024 0  % Final    Absolute Neutrophils 04/27/2024 7.5  1.6 - 8.3 10e3/uL Final    Absolute Lymphocytes 04/27/2024 1.9  0.8 - 5.3 10e3/uL Final    Absolute Monocytes 04/27/2024 1.4 (H)  0.0 - 1.3 10e3/uL Final    Absolute  Eosinophils 04/27/2024 0.0  0.0 - 0.7 10e3/uL Final    Absolute Basophils 04/27/2024 0.0  0.0 - 0.2 10e3/uL Final    RBC Morphology 04/27/2024 Confirmed RBC Indices   Final    Platelet Assessment 04/27/2024 Automated Count Confirmed. Platelet morphology is normal.  Automated Count Confirmed. Platelet morphology is normal. Final    Reactive Lymphocytes 04/27/2024 Present (A)  None Seen Final    Color Urine 04/28/2024 Yellow  Colorless, Straw, Light Yellow, Yellow Final    Appearance Urine 04/28/2024 Turbid (A)  Clear Final    Glucose Urine 04/28/2024 Negative  Negative mg/dL Final    Bilirubin Urine 04/28/2024 Negative  Negative Final    Ketones Urine 04/28/2024 40 (A)  Negative mg/dL Final    Specific Gravity Urine 04/28/2024 1.029  1.001 - 1.030 Final    Blood Urine 04/28/2024 0.2 mg/dL (A)  Negative Final    pH Urine 04/28/2024 6.0  5.0 - 7.0 Final    Protein Albumin Urine 04/28/2024 100 (A)  Negative mg/dL Final    Urobilinogen Urine 04/28/2024 <2.0  <2.0 mg/dL Final    Nitrite Urine 04/28/2024 Negative  Negative Final    Leukocyte Esterase Urine 04/28/2024 Negative  Negative Final    Mucus Urine 04/28/2024 Present (A)  None Seen /LPF Final    Amorphous Crystals Urine 04/28/2024 Few (A)  None Seen /HPF Final    RBC Urine 04/28/2024 2  <=2 /HPF Final    WBC Urine 04/28/2024 2  <=5 /HPF Final    Squamous Epithelials Urine 04/28/2024 <1  <=1 /HPF Final    Troponin T, High Sensitivity 04/28/2024 3,611 (HH)  <=22 ng/L Final    Either a High Sensitivity Troponin T baseline (0 hours) value = 100 ng/L, or an increase in High Sensitivity Troponin T = 7 ng/L at 2 hours compared to 0 hours (2-0 hours), suggests myocardial injury, and urgent clinical attention is required.    If the 2-0 hours increase is <7 ng/L, a High Sensitivity Troponin T result above gender-specific reference ranges warrants further evaluation.   Recommendations for further evaluation include correlation with clinical decision-making tool (e.g., HEART),  a 3rd High Sensitivity Troponin T test 2 hours after the 2nd (a 20% change from baseline would represent concern), admission for observation, close PCC/cardiology follow-up, or urgent outpatient provocative testing.    Influenza A PCR 04/28/2024 Negative  Negative Final    Influenza B PCR 04/28/2024 Negative  Negative Final    RSV PCR 04/28/2024 Negative  Negative Final    SARS CoV2 PCR 04/28/2024 Negative  Negative Final    NEGATIVE: SARS-CoV-2 (COVID-19) RNA not detected, presumed negative.    Troponin T, High Sensitivity 04/28/2024 3,369 (HH)  <=22 ng/L Final    Either a High Sensitivity Troponin T baseline (0 hours) value = 100 ng/L, or an increase in High Sensitivity Troponin T = 7 ng/L at 2 hours compared to 0 hours (2-0 hours), suggests myocardial injury, and urgent clinical attention is required.    If the 2-0 hours increase is <7 ng/L, a High Sensitivity Troponin T result above gender-specific reference ranges warrants further evaluation.   Recommendations for further evaluation include correlation with clinical decision-making tool (e.g., HEART), a 3rd High Sensitivity Troponin T test 2 hours after the 2nd (a 20% change from baseline would represent concern), admission for observation, close PCC/cardiology follow-up, or urgent outpatient provocative testing.    Troponin T, High Sensitivity 04/28/2024 3,619 (HH)  <=22 ng/L Final    Either a High Sensitivity Troponin T baseline (0 hours) value = 100 ng/L, or an increase in High Sensitivity Troponin T = 7 ng/L at 2 hours compared to 0 hours (2-0 hours), suggests myocardial injury, and urgent clinical attention is required.    If the 2-0 hours increase is <7 ng/L, a High Sensitivity Troponin T result above gender-specific reference ranges warrants further evaluation.   Recommendations for further evaluation include correlation with clinical decision-making tool (e.g., HEART), a 3rd High Sensitivity Troponin T test 2 hours after the 2nd (a 20% change from  baseline would represent concern), admission for observation, close PCC/cardiology follow-up, or urgent outpatient provocative testing.    Protein Total 04/28/2024 7.0  6.4 - 8.3 g/dL Final    Albumin 04/28/2024 3.7  3.5 - 5.2 g/dL Final    Bilirubin Total 04/28/2024 0.5  <=1.2 mg/dL Final    Alkaline Phosphatase 04/28/2024 81  40 - 150 U/L Final    Reference intervals for this test were updated on 11/14/2023 to more accurately reflect our healthy population. There may be differences in the flagging of prior results with similar values performed with this method. Interpretation of those prior results can be made in the context of the updated reference intervals.    AST 04/28/2024 180 (H)  0 - 45 U/L Final    Reference intervals for this test were updated on 6/12/2023 to more accurately reflect our healthy population. There may be differences in the flagging of prior results with similar values performed with this method. Interpretation of those prior results can be made in the context of the updated reference intervals.    ALT 04/28/2024 61  0 - 70 U/L Final    Reference intervals for this test were updated on 6/12/2023 to more accurately reflect our healthy population. There may be differences in the flagging of prior results with similar values performed with this method. Interpretation of those prior results can be made in the context of the updated reference intervals.      Bilirubin Direct 04/28/2024 <0.20  0.00 - 0.30 mg/dL Final    LVEF  04/28/2024 60-65%   Final    Troponin T, High Sensitivity 04/29/2024 4,013 (HH)  <=22 ng/L Final    Either a High Sensitivity Troponin T baseline (0 hours) value = 100 ng/L, or an increase in High Sensitivity Troponin T = 7 ng/L at 2 hours compared to 0 hours (2-0 hours), suggests myocardial injury, and urgent clinical attention is required.    If the 2-0 hours increase is <7 ng/L, a High Sensitivity Troponin T result above gender-specific reference ranges warrants further  evaluation.   Recommendations for further evaluation include correlation with clinical decision-making tool (e.g., HEART), a 3rd High Sensitivity Troponin T test 2 hours after the 2nd (a 20% change from baseline would represent concern), admission for observation, close PCC/cardiology follow-up, or urgent outpatient provocative testing.    WBC Count 04/29/2024 10.3  4.0 - 11.0 10e3/uL Final    RBC Count 04/29/2024 5.32  4.40 - 5.90 10e6/uL Final    Hemoglobin 04/29/2024 15.5  13.3 - 17.7 g/dL Final    Hematocrit 04/29/2024 45.3  40.0 - 53.0 % Final    MCV 04/29/2024 85  78 - 100 fL Final    MCH 04/29/2024 29.1  26.5 - 33.0 pg Final    MCHC 04/29/2024 34.2  31.5 - 36.5 g/dL Final    RDW 04/29/2024 14.1  10.0 - 15.0 % Final    Platelet Count 04/29/2024 295  150 - 450 10e3/uL Final    Sodium 04/29/2024 141  135 - 145 mmol/L Final    Reference intervals for this test were updated on 09/26/2023 to more accurately reflect our healthy population. There may be differences in the flagging of prior results with similar values performed with this method. Interpretation of those prior results can be made in the context of the updated reference intervals.     Potassium 04/29/2024 3.9  3.4 - 5.3 mmol/L Final    Chloride 04/29/2024 104  98 - 107 mmol/L Final    Carbon Dioxide (CO2) 04/29/2024 29  22 - 29 mmol/L Final    Anion Gap 04/29/2024 8  7 - 15 mmol/L Final    Urea Nitrogen 04/29/2024 11.6  6.0 - 20.0 mg/dL Final    Creatinine 04/29/2024 0.84  0.67 - 1.17 mg/dL Final    GFR Estimate 04/29/2024 >90  >60 mL/min/1.73m2 Final    Calcium 04/29/2024 8.6  8.6 - 10.0 mg/dL Final    Glucose 04/29/2024 93  70 - 99 mg/dL Final    ALT 04/29/2024 68  0 - 70 U/L Final    Troponin T, High Sensitivity 04/29/2024 3,335 (HH)  <=22 ng/L Final    Either a High Sensitivity Troponin T baseline (0 hours) value = 100 ng/L, or an increase in High Sensitivity Troponin T = 7 ng/L at 2 hours compared to 0 hours (2-0 hours), suggests myocardial injury,  and urgent clinical attention is required.    If the 2-0 hours increase is <7 ng/L, a High Sensitivity Troponin T result above gender-specific reference ranges warrants further evaluation.   Recommendations for further evaluation include correlation with clinical decision-making tool (e.g., HEART), a 3rd High Sensitivity Troponin T test 2 hours after the 2nd (a 20% change from baseline would represent concern), admission for observation, close PCC/cardiology follow-up, or urgent outpatient provocative testing.    Troponin T, High Sensitivity 04/29/2024 3,160 (HH)  <=22 ng/L Final    Either a High Sensitivity Troponin T baseline (0 hours) value = 100 ng/L, or an increase in High Sensitivity Troponin T = 7 ng/L at 2 hours compared to 0 hours (2-0 hours), suggests myocardial injury, and urgent clinical attention is required.    If the 2-0 hours increase is <7 ng/L, a High Sensitivity Troponin T result above gender-specific reference ranges warrants further evaluation.   Recommendations for further evaluation include correlation with clinical decision-making tool (e.g., HEART), a 3rd High Sensitivity Troponin T test 2 hours after the 2nd (a 20% change from baseline would represent concern), admission for observation, close PCC/cardiology follow-up, or urgent outpatient provocative testing.    Troponin T, High Sensitivity 04/29/2024 2,658 (HH)  <=22 ng/L Final    Either a High Sensitivity Troponin T baseline (0 hours) value = 100 ng/L, or an increase in High Sensitivity Troponin T = 7 ng/L at 2 hours compared to 0 hours (2-0 hours), suggests myocardial injury, and urgent clinical attention is required.    If the 2-0 hours increase is <7 ng/L, a High Sensitivity Troponin T result above gender-specific reference ranges warrants further evaluation.   Recommendations for further evaluation include correlation with clinical decision-making tool (e.g., HEART), a 3rd High Sensitivity Troponin T test 2 hours after the 2nd (a 20%  change from baseline would represent concern), admission for observation, close PCC/cardiology follow-up, or urgent outpatient provocative testing.    Troponin T, High Sensitivity 04/30/2024 2,375 (HH)  <=22 ng/L Final    Either a High Sensitivity Troponin T baseline (0 hours) value = 100 ng/L, or an increase in High Sensitivity Troponin T = 7 ng/L at 2 hours compared to 0 hours (2-0 hours), suggests myocardial injury, and urgent clinical attention is required.    If the 2-0 hours increase is <7 ng/L, a High Sensitivity Troponin T result above gender-specific reference ranges warrants further evaluation.   Recommendations for further evaluation include correlation with clinical decision-making tool (e.g., HEART), a 3rd High Sensitivity Troponin T test 2 hours after the 2nd (a 20% change from baseline would represent concern), admission for observation, close PCC/cardiology follow-up, or urgent outpatient provocative testing.       A/P:  1. Myopericarditis  Much better- follow-up with cardiology as scheduled    2. Suspected sleep apnea  - Adult Sleep Eval & Management  Referral; Future      Signed Electronically by: Krystyna Jack, CNP

## 2024-06-10 ENCOUNTER — TELEPHONE (OUTPATIENT)
Dept: CARDIOLOGY | Facility: CLINIC | Age: 39
End: 2024-06-10
Payer: COMMERCIAL

## 2024-06-10 NOTE — TELEPHONE ENCOUNTER
----- Message from Weston Lang MD sent at 6/9/2024  3:37 PM CDT -----  Regarding: RE: Deepika appt  Do we know why he no showed? I wouldn't favor rescheduling unless patient requests it. [facility] [place]  ----- Message -----  From: Michelle Garner RN  Sent: 6/6/2024  11:00 AM CDT  To: Rama Solitario; Weston Lang MD  Subject: FW: Deepika appt                                      Dr Lang, You saw this patient at [facility] during hospitalization 4/27 for myopericarditis. He was scheduled for stress MRI today (was a No Show) and a RAC appt with Dr Deepika Maharaj/Devika. Please advise if ok to reschedule stress MRI to end of July with RAC follow-up appt early August?  -sea  ----- Message -----  From: Rama Solitario  Sent: 6/6/2024  10:23 AM CDT  To: Michelle Garner RN  Subject: RE: Deepika appt                                      The last day of July, and then into August.  ----- Message -----  From: Michelle Garner RN  Sent: 6/6/2024  10:11 AM CDT  To: Rama Solitario  Subject: RE: Deepika wilkinst                                      How far out is MRI scheduling, and can we get him in quickly in RAC if move appt to follow MRI? -sea  ----- Message -----  From: Rama Solitario  Sent: 6/6/2024   8:49 AM CDT  To: Michelle Garner RN  Subject: Deepika appt                                          Marques Martinez,    This pt no showed to his Stress MRI today. Do you want him to keep his follow up with Dr. Deepika Day, or reschedule to after the MRI? It looks like it is RAC spot that he is in. Thanks!

## 2024-06-12 ENCOUNTER — TELEPHONE (OUTPATIENT)
Dept: CARDIOLOGY | Facility: CLINIC | Age: 39
End: 2024-06-12
Payer: COMMERCIAL

## 2024-06-12 NOTE — TELEPHONE ENCOUNTER
----- Message from Weston Lang MD sent at 6/9/2024  3:37 PM CDT -----  Regarding: RE: Deepika appt  Do we know why he no showed? I wouldn't favor rescheduling unless patient requests it. [facility] [place]  ----- Message -----  From: Michelle Garner RN  Sent: 6/6/2024  11:00 AM CDT  To: Rama Solitario; Weston Lang MD  Subject: FW: Deepika appt                                      Dr Lang, You saw this patient at [facility] during hospitalization 4/27 for myopericarditis. He was scheduled for stress MRI today (was a No Show) and a RAC appt with Dr Deepika Maharaj/Devika. Please advise if ok to reschedule stress MRI to end of July with RAC follow-up appt early August?  -sea  ----- Message -----  From: Rama Solitario  Sent: 6/6/2024  10:23 AM CDT  To: Michelle Garner RN  Subject: RE: Deepika appt                                      The last day of July, and then into August.  ----- Message -----  From: Michelle Garner RN  Sent: 6/6/2024  10:11 AM CDT  To: Rama Solitario  Subject: RE: Deepika wilkinst                                      How far out is MRI scheduling, and can we get him in quickly in RAC if move appt to follow MRI? -sea  ----- Message -----  From: Rama Solitario  Sent: 6/6/2024   8:49 AM CDT  To: Michelle Garner RN  Subject: Deepika appt                                          Marques Martinez,    This pt no showed to his Stress MRI today. Do you want him to keep his follow up with Dr. Deepika Day, or reschedule to after the MRI? It looks like it is RAC spot that he is in. Thanks!

## 2024-06-12 NOTE — TELEPHONE ENCOUNTER
Called patient X2 separate encounters -- unable to reach. Left message for patient to return call to discuss recommendations. -sea

## 2024-07-10 DIAGNOSIS — I40.0 ACUTE VIRAL MYOCARDITIS: Primary | ICD-10-CM

## 2024-10-09 ENCOUNTER — OFFICE VISIT (OUTPATIENT)
Dept: SLEEP MEDICINE | Facility: CLINIC | Age: 39
End: 2024-10-09
Attending: NURSE PRACTITIONER
Payer: COMMERCIAL

## 2024-10-09 VITALS
HEART RATE: 64 BPM | OXYGEN SATURATION: 96 % | DIASTOLIC BLOOD PRESSURE: 103 MMHG | SYSTOLIC BLOOD PRESSURE: 142 MMHG | BODY MASS INDEX: 32.27 KG/M2 | WEIGHT: 218.5 LBS

## 2024-10-09 DIAGNOSIS — R06.83 SNORING: ICD-10-CM

## 2024-10-09 DIAGNOSIS — R03.0 ELEVATED BLOOD PRESSURE READING IN OFFICE WITHOUT DIAGNOSIS OF HYPERTENSION: ICD-10-CM

## 2024-10-09 DIAGNOSIS — R06.81 WITNESSED APNEIC SPELLS: Primary | ICD-10-CM

## 2024-10-09 DIAGNOSIS — G47.26 SHIFT WORK SLEEP DISORDER: ICD-10-CM

## 2024-10-09 PROCEDURE — 99204 OFFICE O/P NEW MOD 45 MIN: CPT | Performed by: PHYSICIAN ASSISTANT

## 2024-10-09 ASSESSMENT — SLEEP AND FATIGUE QUESTIONNAIRES
HOW LIKELY ARE YOU TO NOD OFF OR FALL ASLEEP WHILE SITTING QUIETLY AFTER LUNCH WITHOUT ALCOHOL: WOULD NEVER DOZE
HOW LIKELY ARE YOU TO NOD OFF OR FALL ASLEEP WHILE LYING DOWN TO REST IN THE AFTERNOON WHEN CIRCUMSTANCES PERMIT: WOULD NEVER DOZE
HOW LIKELY ARE YOU TO NOD OFF OR FALL ASLEEP WHILE WATCHING TV: WOULD NEVER DOZE
HOW LIKELY ARE YOU TO NOD OFF OR FALL ASLEEP WHILE SITTING INACTIVE IN A PUBLIC PLACE: WOULD NEVER DOZE
HOW LIKELY ARE YOU TO NOD OFF OR FALL ASLEEP IN A CAR, WHILE STOPPED FOR A FEW MINUTES IN TRAFFIC: WOULD NEVER DOZE
HOW LIKELY ARE YOU TO NOD OFF OR FALL ASLEEP WHILE SITTING AND READING: WOULD NEVER DOZE
HOW LIKELY ARE YOU TO NOD OFF OR FALL ASLEEP WHEN YOU ARE A PASSENGER IN A CAR FOR AN HOUR WITHOUT A BREAK: WOULD NEVER DOZE
HOW LIKELY ARE YOU TO NOD OFF OR FALL ASLEEP WHILE SITTING AND TALKING TO SOMEONE: WOULD NEVER DOZE

## 2024-10-09 NOTE — PATIENT INSTRUCTIONS
"        MY TREATMENT INFORMATION FOR SLEEP APNEA-  Gualberto Farrell    Am I having a home sleep study?  --->Watch the video for the device you are using:    -/drop off device-   https://www.youFungos.com/watch?v=yGGFBdELGhk    Frequently asked questions:  1. What is Obstructive Sleep Apnea (DARREL)? DARREL is the most common type of sleep apnea. Apnea means, \"without breath.\"  Apnea is most often caused by narrowing or collapse of the upper airway as muscles relax during sleep.   Almost everyone has occasional apneas. Most people with sleep apnea have had brief interruptions at night frequently for many years.  The severity of sleep apnea is related to how frequent and severe the events are.   2. What are the consequences of DARREL? Symptoms include: feeling sleepy during the day, snoring loudly, gasping or stopping of breathing, trouble sleeping, and occasionally morning headaches or heartburn at night.  Sleepiness can be serious and even increase the risk of falling asleep while driving. Other health consequences may include development of high blood pressure and other cardiovascular disease in persons who are susceptible. Untreated DARREL  can contribute to heart disease, stroke and diabetes.   3. What are the treatment options? In most situations, sleep apnea is a lifelong disease that must be managed with daily therapy. Medications are not effective for sleep apnea and surgery is generally not considered until other therapies have been tried. Your treatment is your choice . Continuous Positive Airway (CPAP) works right away and is the therapy that is effective in nearly everyone. An oral device to hold your jaw forward is usually the next most reliable option. Other options include postioning devices (to keep you off your back), weight loss, and surgery including a tongue pacing device. There is more detail about some of these options below.  4. Are my sleep studies covered by insurance? Although we will request " verification of coverage, we advise you also check in advance of the study to ensure there is coverage.    Important tips for those choosing CPAP and similar devices  REMEMBER-IF YOU RECEIVE A CALL FROM  [phone]-->IT IS TO SETUP A DEVICE  For new devices, sign up for device FRANKY to monitor your device for your followup visits  We encourage you to utilize the [employer] franky or website ( https://[employer].Truly/ ) to monitor your therapy progress and share the data with your healthcare team when you discuss your sleep apnea.                                                    Know your equipment:  CPAP is continuous positive airway pressure that prevents obstructive sleep apnea by keeping the throat from collapsing while you are sleeping. In most cases, the device is  smart  and can slowly self-adjusts if your throat collapses and keeps a record every day of how well you are treated-this information is available to you and your care team.  BPAP is bilevel positive airway pressure that keeps your throat open and also assists each breath with a pressure boost to maintain adequate breathing.  Special kinds of BPAP are used in patients who have inadequate breathing from lung or heart disease. In most cases, the device is  smart  and can slowly self-adjusts to assist breathing. Like CPAP, the device keeps a record of how well you are treated.  Your mask is your connection to the device. You get to choose what feels most comfortable and the staff will help to make sure if fits. Here: are some examples of the different masks that are available: Magnetic mask aids may assist with use but there are safety issues that should be addressed when considering with magnets* ( see end of discussion).       Key points to remember on your journey with sleep apnea:  Sleep study.  PAP devices often need to be adjusted during a sleep study to show that they are effective and adjusted right.  Good tips to remember: Try wearing just  the mask during a quiet time during the day so your body adapts to wearing it. A humidifier is recommended for comfort in most cases to prevent drying of your nose and throat. Allergy medication from your provider may help you if you are having nasal congestion.  Getting settled-in. It takes more than one night for most of us to get used to wearing a mask. Try wearing just the mask during a quiet time during the day so your body adapts to wearing it. A humidifier is recommended for comfort in most cases. Our team will work with you carefully on the first day and will be in contact within 4 days and again at 2 and 4 weeks for advice and remote device adjustments. Your therapy is evaluated by the device each day.   Use it every night. The more you are able to sleep naturally for 7-8 hours, the more likely you will have good sleep and to prevent health risks or symptoms from sleep apnea. Even if you use it 4 hours it helps. Occasionally all of us are unable to use a medical therapy, in sleep apnea, it is not dangerous to miss one night.   Communicate. Call our skilled team on the number provided on the first day if your visit for problems that make it difficult to wear the device. Over 2 out of 3 patients can learn to wear the device long-term with help from our team. Remember to call our team or your sleep providers if you are unable to wear the device as we may have other solutions for those who cannot adapt to mask CPAP therapy. It is recommended that you sleep your sleep provider within the first 3 months and yearly after that if you are not having problems.   Use it for your health. We encourage use of CPAP masks during daytime quiet periods to allow your face and brain to adapt to the sensation of CPAP so that it will be a more natural sensation to awaken to at night or during naps. This can be very useful during the first few weeks or months of adapting to CPAP though it does not help medically to wear CPAP  during wakefulness and  should not be used as a strategy just to meet guidelines.  Take care of your equipment. Make sure you clean your mask and tubing using directions every day and that your filter and mask are replaced as recommended or if they are not working.     *Masks with magnets:  Updated Contraindications  Masks with magnetic components are contraindicated for use by patients where they, or anyone in close physical contact while using the mask, have the following:   Active medical implants that interact with magnets (i.e., pacemakers, implantable cardioverter defibrillators (ICD), neurostimulators, cerebrospinal fluid (CSF) shunts, insulin/infusion pumps)   Metallic implants/objects containing ferromagnetic material (i.e., aneurysm clips/flow disruption devices, embolic coils, stents, valves, electrodes, implants to restore hearing or balance with implanted magnets, ocular implants, metallic splinters in the eye)  Updated Warning  Keep the mask magnets at a safe distance of at least 6 inches (150 mm) away from implants or medical devices that may be adversely affected by magnetic interference. This warning applies to you or anyone in close physical contact with your mask. The magnets are in the frame and lower headgear clips, with a magnetic field strength of up to 400mT. When worn, they connect to secure the mask but may inadvertently detach while asleep.  Implants/medical devices, including those listed within contraindications, may be adversely affected if they change function under external magnetic fields or contain ferromagnetic materials that attract/repel to magnetic fields (some metallic implants, e.g., contact lenses with metal, dental implants, metallic cranial plates, screws, nirmala hole covers, and bone substitute devices). Consult your physician and  of your implant / other medical device for information on the potential adverse effects of magnetic fields.    BESIDES CPAP, WHAT  OTHER THERAPIES ARE THERE?    Positioning Device  Positioning devices are generally used when sleep apnea is mild and only occurs on your back.This example shows a pillow that straps around the waist. It may be appropriate for those whose sleep study shows milder sleep apnea that occurs primarily when lying flat on one's back. Preliminary studies have shown benefit but effectiveness at home may need to be verified by a home sleep test. These devices are generally not covered by medical insurance.  Examples of devices that maintain sleeping on the back to prevent snoring and mild sleep apnea.    Belt type body positioner  http://[employer].Ordoro/    Electronic reminder  http://nightshifttherapy.com/            Oral Appliance  What is oral appliance therapy?  An oral appliance device fits on your teeth at night like a retainer used after having braces. The device is made by a specialized dentist and requires several visits over 1-2 months before a manufactured device is made to fit your teeth and is adjusted to prevent your sleep apnea. Once an oral device is working properly, snoring should be improved. A home sleep test may be recommended at that time if to determine whether the sleep apnea is adequately treated.       Some things to remember:  -Oral devices are often, but not always, covered by your medical insurance. Be sure to check with your insurance provider.   -If you are referred for oral therapy, you will be given a list of specialized dentists to consider or you may choose to visit the Web site of the American Academy of Dental Sleep Medicine  -Oral devices are less likely to work if you have severe sleep apnea or are extremely overweight.     More detailed information  An oral appliance is a small acrylic device that fits over the upper and lower teeth  (similar to a retainer or a mouth guard). This device slightly moves jaw forward, which moves the base of the tongue forward, opens the airway, improves  breathing for effective treat snoring and obstructive sleep apnea in perhaps 7 out of 10 people .  The best working devices are custom-made by a dental device  after a mold is made of the teeth 1, 2, 3.  When is an oral appliance indicated?  Oral appliance therapy is recommended as a first-line treatment for patients with primary snoring, mild sleep apnea, and for patients with moderate sleep apnea who prefer appliance therapy to use of CPAP4, 5. Severity of sleep apnea is determined by sleep testing and is based on the number of respiratory events per hour of sleep.   How successful is oral appliance therapy?  The success rate of oral appliance therapy in patients with mild sleep apnea is 75-80% while in patients with moderate sleep apnea it is 50-70%. The chance of success in patients with severe sleep apnea is 40-50%. The research also shows that oral appliances have a beneficial effect on the cardiovascular health of DARREL patients at the same magnitude as CPAP therapy7.  Oral appliances should be a second-line treatment in cases of severe sleep apnea, but if not completely successful then a combination therapy utilizing CPAP plus oral appliance therapy may be effective. Oral appliances tend to be effective in a broad range of patients although studies show that the patients who have the highest success are females, younger patients, those with milder disease, and less severe obesity. 3, 6.   Finding a dentist that practices dental sleep medicine  Specific training is available through the American Academy of Dental Sleep Medicine for dentists interested in working in the field of sleep. To find a dentist who is educated in the field of sleep and the use of oral appliances, near you, visit the Web site of the American Academy of Dental Sleep Medicine.    References  1. Rajinder et al. Objectively measured vs self-reported compliance during oral appliance therapy for sleep-disordered breathing. Chest  2013; 144(5): 3921-8362.  2. Ruiz, et al. Objective measurement of compliance during oral appliance therapy for sleep-disordered breathing. Thorax 2013; 68(1): 91-96.  3. Mic et al. Mandibular advancement devices in 620 men and women with DARREL and snoring: tolerability and predictors of treatment success. Chest 2004; 125: 6844-7248.  4. Emigdio et al. Oral appliances for snoring and DARREL: a review. Sleep 2006; 29: 244-262.  5. Armando et al. Oral appliance treatment for DARREL: an update. J Clin Sleep Med 2014; 10(2): 215-227.  6. Aristeo et al. Predictors of OSAH treatment outcome. J Dent Res 2007; 86: 4686-1161.      Weight Loss:   Your Body mass index is 32.27 kg/m .    Being overweight does not necessarily mean you will have health consequences.  Those who have BMI over 35 or over 27 with existing medical conditions carries greater risk.   Weight loss decreases severity of sleep apnea in most people with obesity. For those with mild obesity who have developed snoring with weight gain, even 15-30 pound weight loss can improve and occasionally milder eliminate sleep apnea.  Structured and life-long dietary and health habits are necessary to lose weight and keep healthier weight levels.     The Comprehensive Weight loss program offers all aspects of weight loss strategies including two Non-Surgical Weight Loss Programs: Medical Weight Management and our 24 Week Healthy Lifestyle Program:    Medical Weight Management: You will meet with a Medical Weight Management Provider, as well as a Registered Dietician. The program may include medication therapy, dietary education, recommended exercise and physical therapy programs, monthly support group meetings, and possible psychological counseling. Follow up visits with the provider or dietician are scheduled based on your progress and needs.    24 Week Healthy Lifestyle Program: This unique program is designed to give you the support of weekly appointments  and activities thru a 24-week period. It may include all of the components of the basic program (above), with the addition of 11 individual Health  Visits, 24-week access to the [employer] website for over 700 online classes, and monthly support group meetings. This program has an out-of-pocket expense of $499 to cover the items that can not be billed to insurance (health coaches and [employer] access), and is non-refundable/non-transferable (you may be able to use a Health Savings Account; ask your HSA provider). There may be an optional meal replacement plan prescribed as well.   Surgical management achieves meaningful long-term weight loss and improvement in health risks in most patients with more severe obesity.      Sleep Apnea Surgery:    Surgery for obstructive sleep apnea is considered generally only when other therapies fail to work. Surgery may be discussed with you if you are having a difficult time tolerating CPAP and or when there is an abnormal structure that requires surgical correction.  Nose and throat surgeries often enlarge the airway to prevent collapse.  Most of these surgeries create pain for 1-2 weeks and up to half of the most common surgeries are not effective throughout life.  You should carefully discuss the benefits and drawbacks to surgery with your sleep provider and surgeon to determine if it is the best solution for you.   More information  Surgery for DARREL is directed at areas that are responsible for narrowing or complete obstruction of the airway during sleep.  There are a wide range of procedures available to enlarge and/or stabilize the airway to prevent blockage of breathing in the three major areas where it can occur: the palate, tongue, and nasal regions.  Successful surgical treatment depends on the accurate identification of the factors responsible for obstructive sleep apnea in each person.  A personalized approach is required because there is no single treatment that  works well for everyone.  Because of anatomic variation, consultation with an examination by a sleep surgeon is a critical first step in determining what surgical options are best for each patient.  In some cases, examination during sedation may be recommended in order to guide the selection of procedures.  Patients will be counseled about risks and benefits as well as the typical recovery course after surgery. Surgery is typically not a cure for a person s DARREL.  However, surgery will often significantly improve one s DARREL severity (termed  success rate ).  Even in the absence of a cure, surgery will decrease the cardiovascular risk associated with OSA7; improve overall quality of life8 (sleepiness, functionality, sleep quality, etc).      Palate Procedures:  Patients with DARREL often have narrowing of their airway in the region of their tonsils and uvula.  The goals of palate procedures are to widen the airway in this region as well as to help the tissues resist collapse.  Modern palate procedure techniques focus on tissue conservation and soft tissue rearrangement, rather than tissue removal.  Often the uvula is preserved in this procedure. Residual sleep apnea is common in patient after pharyngoplasty with an average reduction in sleep apnea events of 33%2.      Tongue Procedures:  ExamWhile patients are awake, the muscles that surround the throat are active and keep this region open for breathing. These muscles relax during sleep, allowing the tongue and other structures to collapse and block breathing.  There are several different tongue procedures available.  Selection of a tongue base procedure depends on characteristics seen on physical exam.  Generally, procedures are aimed at removing bulky tissues in this area or preventing the back of the tongue from falling back during sleep.  Success rates for tongue surgery range from 50-62%3.    Hypoglossal Nerve Stimulation:  Hypoglossal nerve stimulation has recently  received approval from the [place] Food and Drug Administration for the treatment of obstructive sleep apnea.  This is based on research showing that the system was safe and effective in treating sleep apnea6.  Results showed that the median AHI score decreased 68%, from 29.3 to 9.0. This therapy uses an implant system that senses breathing patterns and delivers mild stimulation to airway muscles, which keeps the airway open during sleep.  The system consists of three fully implanted components: a small generator (similar in size to a pacemaker), a breathing sensor, and a stimulation lead.  Using a small handheld remote, a patient turns the therapy on before bed and off upon awakening.    Candidates for this device must be greater than 18 years of age, have moderate to severe obstructive sleep apnea with less than 25% central events  (AHI between 15-65), BMI less than 35, have tried CPAP/oral appliance for at least 8 weeks without success, and have appropriate upper airway anatomy (determined by a sleep endoscopy performed by Dr. Desmond Mcelroy or Dr. Eugenio Grier).    Nasal Procedures:  Nasal obstruction can interfere with nasal breathing during the day and night.  Studies have shown that relief of nasal obstruction can improve the ability of some patients to tolerate positive airway pressure therapy for obstructive sleep apnea1.  Treatment options include medications such as nasal saline, topical corticosteroid and antihistamine sprays, and oral medications such as antihistamines or decongestants. Non-surgical treatments can include external nasal dilators for selected patients. If these are not successful by themselves, surgery can improve the nasal airway either alone or in combination with these other options.        Combination Procedures:  Combination of surgical procedures and other treatments may be recommended, particularly if patients have more than one area of narrowing or persistent positional  disease.  The success rate of combination surgery ranges from 66-80%2,3.    References  Adrienne BELL. The Role of the Nose in Snoring and Obstructive Sleep Apnoea: An Update.  Eur Arch Otorhinolaryngol. 2011; 268: 1365-73.   Franklin SM; Brook JA; Calra JR; Pallanch JF; Philippe MB; Bo SG; Harry LOWE. Surgical modifications of the upper airway for obstructive sleep apnea in adults: a systematic review and meta-analysis. SLEEP 2010;33(10):4749-9205. Martha MAC. Hypopharyngeal surgery in obstructive sleep apnea: an evidence-based medicine review.  Arch Otolaryngol Head Neck Surg. 2006 Feb;132(2):206-13.  Richie YH1, Boogie Y, Tyrone JAJA. The efficacy of anatomically based multilevel surgery for obstructive sleep apnea. Otolaryngol Head Neck Surg. 2003 Oct;129(4):327-35.  Martha MAC, Goldberg A. Hypopharyngeal Surgery in Obstructive Sleep Apnea: An Evidence-Based Medicine Review. Arch Otolaryngol Head Neck Surg. 2006 Feb;132(2):206-13.  Alaina PJ et al. Upper-Airway Stimulation for Obstructive Sleep Apnea.  N Engl J Med. 2014 Jan 9;370(2):139-49.  Stacey Y et al. Increased Incidence of Cardiovascular Disease in Middle-aged Men with Obstructive Sleep Apnea. Am J Respir Crit Care Med; 2002 166: 159-165  Pineda EM et al. Studying Life Effects and Effectiveness of Palatopharyngoplasty (SLEEP) study: Subjective Outcomes of Isolated Uvulopalatopharyngoplasty. Otolaryngol Head Neck Surg. 2011; 144: 623-631.        WHAT IF I ONLY HAVE SNORING?    Mandibular advancement devices, lateral sleep positioning, long-term weight loss and treatment of nasal allergies have been shown to improve snoring.  Exercising tongue muscles with a game (https://[employer]/us/franky/soundly-reduce-snoring/ua1229151371) or stimulating the tongue during the day with a device (https://doi.org/10.3390/cmf99906119) have improved snoring in some  individuals.  https://www.[employer]/  https://www.sleepfoundation.org/best-anti-snoring-mouthpieces-and-mouthguards    Remember to Drive Safe... Drive Alive     Sleep health profoundly affects your health, mood, and your safety.  Thirty three percent of the population (one in three of us) is not getting enough sleep and many have a sleep disorder. Not getting enough sleep or having an untreated / undertreated sleep condition may make us sleepy without even knowing it. In fact, our driving could be dramatically impaired due to our sleep health. As your provider, here are some things I would like you to know about driving:     Here are some warning signs for impairment and dangerous drowsy driving:              -Having been awake more than 16 hours               -Looking tired               -Eyelid drooping              -Head nodding (it could be too late at this point)              -Driving for more than 30 minutes     Some things you could do to make the driving safer if you are experiencing some drowsiness:              -Stop driving and rest              -Call for transportation              -Make sure your sleep disorder is adequately treated     Some things that have been shown NOT to work when experiencing drowsiness while driving:              -Turning on the radio              -Opening windows              -Eating any  distracting  /  entertaining  foods (e.g., sunflower seeds, candy, or any other)              -Talking on the phone      Your decision may not only impact your life, but also the life of others. Please, remember to drive safe for yourself and all of us.

## 2024-10-09 NOTE — PROGRESS NOTES
Outpatient Sleep Medicine Consultation:    Name: Gualberto Farrell MRN# [id number]   Age: 39 year old YOB: 1985     Date of Consultation: October 9, 2024  Consultation is requested by: Krystyna Mcdonald, CNP  [address] [place] [address] Krystyna Mcdonald  Primary care provider: System, Provider Not In       Reason for Sleep Consult:     Gualberto Farrell is sent by Krystyna Mcdonald for a sleep consultation regarding 1. Suspected sleep apnea    Patient s Reason for visit  Gualberto Farrell main reason for visit: not breathing right when sleeping  Patient states problem(s) started: 2022  Gualberto Farrell's goals for this visit: help with sleeping         Assessment and Plan:     Summary Sleep Diagnoses:  1. Witnessed apneic spells  2.Snoring  3. Shift work sleep disorder  Comorbid Diagnoses:  4. Elevated blood pressure reading in office without diagnosis of hypertension    Patient is being evaluated for Obstructive Sleep Apnea (DARREL).  Patient's risk factors for DARREL include: snoring, witnessed apneas, reported episodic hypoxemia in sleep during hospitalization 4/2024, unrefreshing sleep, enlarged neck girth (46 cm), obesity (BMI 32), and male gender. We discussed pathophysiology of DARREL and consequences of untreated sleep apnea. Patient is interested in treatment and willing to undergo overnight sleep testing. Discussed testing with overnight attended polysomnography versus home sleep apnea testing.  Given insurance preference and complicating factor of his shiftwork agreed to pursue home sleep apnea testing so can keep with his regular routine.  Discussed if significant DARREL is seen on testing CPAP therapy will be recommended, if more mild sleep apnea seen on testing could consider something like mandibular advancement device in its place.  He appeared open/willing to pursue any form of treatment today but will discuss in more detail at next visit.  Of note, patient recently moved up to [place]  "[place] and may elect to follow with one of my colleagues at our [place] office since that would save him a drive, let him know I am more than happy to see him going forward but I would understand if he wants to be seen closer to home.  Will plan on a follow-up visit shortly following his testing to go over results together, if follow-up is booked far out due to our scheduling as I suspect agreed that I will send results via Goko message with plan to place orders for therapy prior to follow up to expedite.  Lastly, patient's blood pressure was elevated today, encouraged close follow-up with PCP.  - HST-Home Sleep Apnea Test - Noxturnal Returnable; Future         History of Present Illness:     Gualberto Farrell is a 39-year-old male with obesity who presents to clinic today for evaluation of suspected sleep apnea.  Patient reports snoring, history of witnessed apneas, and witnessed apneic events/episodic hypoxemia noted during his hospitalization in April-\"one night they gave me oxygen  to have the alarm stop going off and the other night the nurse just shut the alarm off because it kept going off when I was sleeping\".    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   Wakes up due to Use the bathroom   He has trouble falling back asleep 5 times a week.   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  Patient is usually up at 10am 11am  Uses alarm: Yes    Weekends/Non-work Days/All Other Days:   Usually gets into bed at 3am  Takes patient about 5am to fall asleep  Patient is usually up at 11am  Uses alarm: Yes    Sleep Need  Patient estimates he gets 4 to 6hours sleep on average   Patient " "thinks he needs about 8 hours sleep    Gualberto Farrell prefers to sleep in this position(s): Side   Patient states they do the following activities in bed: Watch TV    Naps  Patient takes a purposeful nap 0 times a week    He feels better after a nap: No  He dozes off unintentionally 2 days per week  Patient has had a driving accident or near-miss due to sleepiness/drowsiness: No  He had a total [place] Sleepiness Scale of 0 (10/09/24 1444), with scores of 10 or higher indicating abnormal levels of sleepiness.     SLEEP DISRUPTIONS:    Breathing/Snoring  Patient snores:Yes   Other people complain about his snoring: Yes   Patient has been told he stops breathing in his sleep:Yes - sister has, wife hasn't mentioned it but she is a deep sleeper   Gasping/choking: \"couple times\"  He has issues with the following: Morning headaches;Morning mouth dryness;Stuffy nose when you wake up;Getting up to urinate more than once  Denies nocturnal GERD    Movement:  Denies restless legs syndrome symptoms  Patient has been told he kicks his legs at night:  No     Behaviors in Sleep:  Denies sleepwalking, sleep talking, sleep eating, bruxism, dream enactment, recurring nightmares, night terrors, sleep paralysis, hypnagogic/hypnopompic hallucinations, cataplexy      CAFFEINE AND OTHER SUBSTANCES:    Patient consumes caffeinated beverages per day:  1  Last caffeine use is usually: 6pm  List of any prescribed or over the counter stimulants that patient takes: None  List of any prescribed or over the counter sleep medication patient takes: sleeping gummy  List of previous sleep medications that patient has tried: not sure of brand  Patient drinks alcohol to help them sleep: No  Patient drinks alcohol near bedtime: No    Family History:  Patient has a family member been diagnosed with a sleep disorder: No      SCALES:    EPWORTH SLEEPINESS SCALE         10/9/2024     2:44 PM    Merritt Sleepiness Scale Brice Greer  [id number]<br>ESS - " [place]/English - Final version - 21 Nov 07 - [facility] [place].)   Sitting and reading Would never doze   Watching TV Would never doze   Sitting, inactive in a public place (e.g. a theatre or a meeting) Would never doze   As a passenger in a car for an hour without a break Would never doze   Lying down to rest in the afternoon when circumstances permit Would never doze   Sitting and talking to someone Would never doze   Sitting quietly after a lunch without alcohol Would never doze   In a car, while stopped for a few minutes in traffic Would never doze   [place] Score (MC) 0   [place] Score (Sleep) 0       INSOMNIA SEVERITY INDEX (JAE)          10/9/2024     2:17 PM   Insomnia Severity Index (JAE)   Difficulty falling asleep 4   Difficulty staying asleep 3   Problems waking up too early 3   How SATISFIED/DISSATISFIED are you with your CURRENT sleep pattern? 4   How NOTICEABLE to others do you think your sleep problem is in terms of impairing the quality of your life? 4   How WORRIED/DISTRESSED are you about your current sleep problem? 4   To what extent do you consider your sleep problem to INTERFERE with your daily functioning (e.g. daytime fatigue, mood, ability to function at work/daily chores, concentration, memory, mood, etc.) CURRENTLY? 4   JAE Total Score 26       Guidelines for Scoring/Interpretation:  Total score categories:  0-7 = No clinically significant insomnia   8-14 = Subthreshold insomnia   15-21 = Clinical insomnia (moderate severity)  22-28 = Clinical insomnia (severe)  Used via courtesy of www.[employer].va.gov with permission from Weston Baldwin PhD., [facility]    Allergies:    No Known Allergies    Medications:    Current Outpatient Medications   Medication Sig Dispense Refill    acetaminophen (TYLENOL) 500 MG tablet Take 500-1,000 mg by mouth every 6 hours as needed for mild pain         Problem List:  Patient Active Problem List    Diagnosis Date Noted    Myopericarditis 04/28/2024      Priority: Medium        Past Medical/Surgical History:  Past Medical History:   Diagnosis Date    Myopericarditis 2024     Past Surgical History:   Procedure Laterality Date    NO PAST SURGERIES         Social History:  Social History     Socioeconomic History    Marital status: Single     Spouse name: Not on file    Number of children: Not on file    Years of education: Not on file    Highest education level: Not on file   Occupational History    Not on file   Tobacco Use    Smoking status: Never     Passive exposure: Never    Smokeless tobacco: Never   Vaping Use    Vaping status: Never Used   Substance and Sexual Activity    Alcohol use: Yes     Alcohol/week: 3.0 standard drinks of alcohol    Drug use: No    Sexual activity: Yes     Partners: Female   Other Topics Concern    Not on file   Social History Narrative    Not on file     Social Determinants of Health     Financial Resource Strain: Not on file   Food Insecurity: Not on file   Transportation Needs: Not on file   Physical Activity: Not on file   Stress: Not on file   Social Connections: Not on file   Interpersonal Safety: Low Risk  (5/6/2024)    Interpersonal Safety     Do you feel physically and emotionally safe where you currently live?: Yes     Within the past 12 months, have you been hit, slapped, kicked or otherwise physically hurt by someone?: No     Within the past 12 months, have you been humiliated or emotionally abused in other ways by your partner or ex-partner?: No   Housing Stability: Not on file       Family History:  Family History   Problem Relation Age of Onset    Diabetes Mother     Unknown/Adopted Father     No Known Problems Sister     No Known Problems Brother        Review of Systems:  In the last TWO WEEKS have you experienced any of the following symptoms?  Fevers: No  Night Sweats: No  Weight Gain: No  Pain at Night: No  Double Vision: No  Changes in Vision: No  Difficulty Breathing through Nose: No  Sore Throat in Morning:  "Yes  Dry Mouth in the Morning: Yes  Shortness of Breath Lying Flat: Yes  Shortness of Breath With Activity: No  Awakening with Shortness of Breath: Yes  Increased Cough: No  Heart Racing at Night: No  Swelling in Feet or Legs: No  Diarrhea at Night: No  Heartburn at Night: No  Urinating More than Once at Night: Yes  Losing Control of Urine at Night: No  Joint Pains at Night: No  Headaches in Morning: Yes  Weakness in Arms or Legs: No  Depressed Mood: Yes  Anxiety: Yes     Physical Examination:  Vitals: BP (!) 142/103   Pulse 64   Wt 99.1 kg (218 lb 8 oz)   SpO2 96%   BMI 32.27 kg/m    BMI= Body mass index is 32.27 kg/m .  General appearance: Awake, alert, cooperative. Well groomed. Sitting comfortably in chair. In no apparent distress.  HEENT: Head: Normocephalic, atraumatic. Eyes: PERRL. Conjunctiva clear. Sclera normal. Nose: External appearance normal. Oropharynx: Mallampati Classification: III. Tonsils:2+  Neck: Neck Cir (cm): 46 cm (10/9/2024  2:34 PM)  Skin:  No rashes or significant lesions.   Neurologic: Alert, oriented x3. No focal neurological deficit. Gait normal.   Psychiatric: Mood euthymic. Affect congruent with full range and intensity.           Data: All pertinent previous laboratory data reviewed     Recent Labs   Lab Test 04/29/24  0859 04/27/24  2323    138   POTASSIUM 3.9 3.4   CHLORIDE 104 99   CO2 29 24   ANIONGAP 8 15   GLC 93 147*   BUN 11.6 10.6   CR 0.84 0.89   JULIANE 8.6 9.2       Recent Labs   Lab Test 04/29/24  0859   WBC 10.3   RBC 5.32   HGB 15.5   HCT 45.3   MCV 85   MCH 29.1   MCHC 34.2   RDW 14.1          Recent Labs   Lab Test 04/29/24  0859 04/28/24  0128   PROTTOTAL  --  7.0   ALBUMIN  --  3.7   BILITOTAL  --  0.5   ALKPHOS  --  81   AST  --  180*   ALT 68 61       No results found for: \"TSH\"    No results found for: \"UAMP\", \"UBARB\", \"BENZODIAZEUR\", \"UCANN\", \"UCOC\", \"OPIT\", \"UPCP\"    No results found for: \"IRONSAT\", \"PV55045\", \"SASHA\"    No results found for: \"PH\", " "\"PHARTERIAL\", \"PO2\", \"LJ2XKPITBLP\", \"SAT\", \"PCO2\", \"HCO3\", \"BASEEXCESS\", \"PAMELA\", \"BEB\"    @LABRCNTIPR(phv:4,pco2v:4,po2v:4,hco3v:4,andra:4,o2per:4)@    Echocardiology:   Study Date: 04/28/2024 10:55 AM  Age: 38 yrs  Gender: Male  Patient [facility]: [facility]  Reason For Study: Acute Pericarditis  Ordering Physician: ELADIO MCCORMACK  Performed By: AGUEDA     BSA: 2.1 m2  Height: 69 in  Weight: 211 lb  HR: 75  BP: 94/61 mmHg  ______________________________________________________________________________  ______________________________________________________________________________  Interpretation Summary     Left ventricular size, wall motion and function are normal. The ejection  fraction is 60-65%.  Normal right ventricle size and systolic function.  There is no pericardial effusion.  No hemodynamically significant valvular abnormalities on 2D or color flow  imaging.    Chest x-ray:   XR Chest 2 Views 04/27/2024    Narrative  EXAM: XR CHEST 2 VIEWS  LOCATION: [facility]  DATE: 4/27/2024    INDICATION: cp  COMPARISON: None.    Impression  IMPRESSION: Negative chest.      Chest CT:   CT Chest Pulmonary Embolism w Contrast 04/28/2024    Narrative  EXAM: CT CHEST PULMONARY EMBOLISM W CONTRAST  LOCATION: [facility]  DATE: 4/28/2024    INDICATION: chest pain, dimer elevated, EKG suggestive of pericarditis  COMPARISON: None.  TECHNIQUE: CT chest pulmonary angiogram during arterial phase injection of IV contrast. Multiplanar reformats and MIP reconstructions were performed. Dose reduction techniques were used.  CONTRAST: Isovue 370 75mL    FINDINGS:  ANGIOGRAM CHEST: No evidence of pulmonary embolus. Thoracic aorta is not well opacified and is  indeterminate for dissection. No CT evidence of right heart strain.    LUNGS AND PLEURA: Mild dependent atelectasis. No pneumothorax, lobar consolidation or effusion.    MEDIASTINUM/AXILLAE: No significant mediastinal or hilar adenopathy. " "Trace pericardial effusion.    CORONARY ARTERY CALCIFICATION: None.    UPPER ABDOMEN: No acute abnormalities    MUSCULOSKELETAL: No acute bony abnormalities    Impression  IMPRESSION:  1.  No evidence of pulmonary embolus.      PFT: Most Recent [facility] Pulmonary Function Testing    No results found for: \"20001\"  No results found for: \"20002\"  No results found for: \"20003\"  No results found for: \"20015\"  No results found for: \"20016\"  No results found for: \"20027\"  No results found for: \"20028\"  No results found for: \"20029\"  No results found for: \"20079\"  No results found for: \"20080\"  No results found for: \"20081\"  No results found for: \"20335\"  No results found for: \"20105\"  No results found for: \"20053\"  No results found for: \"20054\"  No results found for: \"20055\"      Bonnie River PA-C 10/9/2024     [facility] [place]  [address] [address]     [facility]  [address] [address]    Chart documentation was completed, in part, with Luminal voice-recognition software. Even though reviewed, some grammatical, spelling, and word errors may remain.    45 minutes spent on day of encounter reviewing medical records, history and physical examination, review of previous testing and interpretation, documentation and further activities as noted above          "

## 2024-10-09 NOTE — PROGRESS NOTES
"Chief Complaint   Patient presents with    Sleep Problem     Check for sleep apnea       Initial BP (!) 142/103   Pulse 64   Wt 99.1 kg (218 lb 8 oz)   SpO2 96%   BMI 32.27 kg/m   Estimated body mass index is 32.27 kg/m  as calculated from the following:    Height as of 5/6/24: 1.753 m (5' 9\").    Weight as of this encounter: 99.1 kg (218 lb 8 oz).    Medication Reconciliation: complete    Neck circumference: 18 inches / 46 centimeters.    ESS    JAE 26    Tyler Morrell MA   "

## 2024-11-20 ENCOUNTER — OFFICE VISIT (OUTPATIENT)
Dept: FAMILY MEDICINE | Facility: CLINIC | Age: 39
End: 2024-11-20
Payer: COMMERCIAL

## 2024-11-20 ENCOUNTER — HOSPITAL ENCOUNTER (EMERGENCY)
Facility: CLINIC | Age: 39
Discharge: HOME | End: 2024-11-20
Attending: EMERGENCY MEDICINE | Admitting: EMERGENCY MEDICINE
Payer: COMMERCIAL

## 2024-11-20 VITALS
WEIGHT: 226 LBS | HEART RATE: 74 BPM | DIASTOLIC BLOOD PRESSURE: 84 MMHG | TEMPERATURE: 97.4 F | OXYGEN SATURATION: 98 % | BODY MASS INDEX: 33.47 KG/M2 | HEIGHT: 69 IN | SYSTOLIC BLOOD PRESSURE: 143 MMHG | RESPIRATION RATE: 18 BRPM

## 2024-11-20 VITALS
TEMPERATURE: 97.7 F | DIASTOLIC BLOOD PRESSURE: 90 MMHG | WEIGHT: 224 LBS | OXYGEN SATURATION: 98 % | HEART RATE: 70 BPM | HEIGHT: 69 IN | SYSTOLIC BLOOD PRESSURE: 130 MMHG | BODY MASS INDEX: 33.18 KG/M2

## 2024-11-20 DIAGNOSIS — R07.89 ATYPICAL CHEST PAIN: Primary | ICD-10-CM

## 2024-11-20 DIAGNOSIS — R07.9 CHEST PAIN, UNSPECIFIED TYPE: ICD-10-CM

## 2024-11-20 DIAGNOSIS — I31.9 MYOPERICARDITIS: ICD-10-CM

## 2024-11-20 LAB
ALBUMIN SERPL BCG-MCNC: 4.1 G/DL (ref 3.5–5.2)
ALP SERPL-CCNC: 105 U/L (ref 40–150)
ALT SERPL W P-5'-P-CCNC: 44 U/L (ref 0–70)
ANION GAP SERPL CALCULATED.3IONS-SCNC: 10 MMOL/L (ref 7–15)
AST SERPL W P-5'-P-CCNC: 29 U/L (ref 0–45)
BASOPHILS # BLD AUTO: 0 10E3/UL (ref 0–0.2)
BASOPHILS NFR BLD AUTO: 0 %
BILIRUB SERPL-MCNC: 0.3 MG/DL
BUN SERPL-MCNC: 12.7 MG/DL (ref 6–20)
CALCIUM SERPL-MCNC: 8.8 MG/DL (ref 8.8–10.4)
CHLORIDE SERPL-SCNC: 106 MMOL/L (ref 98–107)
CREAT SERPL-MCNC: 0.8 MG/DL (ref 0.67–1.17)
D DIMER PPP FEU-MCNC: 0.29 UG/ML FEU (ref 0–0.5)
EGFRCR SERPLBLD CKD-EPI 2021: >90 ML/MIN/1.73M2
EOSINOPHIL # BLD AUTO: 0.2 10E3/UL (ref 0–0.7)
EOSINOPHIL NFR BLD AUTO: 2 %
ERYTHROCYTE [DISTWIDTH] IN BLOOD BY AUTOMATED COUNT: 13.6 % (ref 10–15)
GLUCOSE SERPL-MCNC: 93 MG/DL (ref 70–99)
HCO3 SERPL-SCNC: 27 MMOL/L (ref 22–29)
HCT VFR BLD AUTO: 42.5 % (ref 40–53)
HGB BLD-MCNC: 14.9 G/DL (ref 13.3–17.7)
HOLD SPECIMEN: NORMAL
IMM GRANULOCYTES # BLD: 0 10E3/UL
IMM GRANULOCYTES NFR BLD: 0 %
LYMPHOCYTES # BLD AUTO: 2.8 10E3/UL (ref 0.8–5.3)
LYMPHOCYTES NFR BLD AUTO: 40 %
MCH RBC QN AUTO: 29.3 PG (ref 26.5–33)
MCHC RBC AUTO-ENTMCNC: 35.1 G/DL (ref 31.5–36.5)
MCV RBC AUTO: 84 FL (ref 78–100)
MONOCYTES # BLD AUTO: 0.8 10E3/UL (ref 0–1.3)
MONOCYTES NFR BLD AUTO: 12 %
NEUTROPHILS # BLD AUTO: 3.2 10E3/UL (ref 1.6–8.3)
NEUTROPHILS NFR BLD AUTO: 46 %
NRBC # BLD AUTO: 0 10E3/UL
NRBC BLD AUTO-RTO: 0 /100
PLATELET # BLD AUTO: 248 10E3/UL (ref 150–450)
POTASSIUM SERPL-SCNC: 3.7 MMOL/L (ref 3.4–5.3)
PROT SERPL-MCNC: 6.9 G/DL (ref 6.4–8.3)
RBC # BLD AUTO: 5.08 10E6/UL (ref 4.4–5.9)
SODIUM SERPL-SCNC: 143 MMOL/L (ref 135–145)
TROPONIN T SERPL HS-MCNC: 8 NG/L
TROPONIN T SERPL HS-MCNC: 8 NG/L
WBC # BLD AUTO: 7 10E3/UL (ref 4–11)

## 2024-11-20 PROCEDURE — 99284 EMERGENCY DEPT VISIT MOD MDM: CPT | Mod: 25

## 2024-11-20 PROCEDURE — 82040 ASSAY OF SERUM ALBUMIN: CPT | Performed by: EMERGENCY MEDICINE

## 2024-11-20 PROCEDURE — 82247 BILIRUBIN TOTAL: CPT | Performed by: EMERGENCY MEDICINE

## 2024-11-20 PROCEDURE — 36415 COLL VENOUS BLD VENIPUNCTURE: CPT | Performed by: EMERGENCY MEDICINE

## 2024-11-20 PROCEDURE — 250N000013 HC RX MED GY IP 250 OP 250 PS 637: Performed by: EMERGENCY MEDICINE

## 2024-11-20 PROCEDURE — 84484 ASSAY OF TROPONIN QUANT: CPT | Performed by: EMERGENCY MEDICINE

## 2024-11-20 PROCEDURE — 85018 HEMOGLOBIN: CPT | Performed by: EMERGENCY MEDICINE

## 2024-11-20 PROCEDURE — 85379 FIBRIN DEGRADATION QUANT: CPT | Performed by: EMERGENCY MEDICINE

## 2024-11-20 PROCEDURE — 85004 AUTOMATED DIFF WBC COUNT: CPT | Performed by: EMERGENCY MEDICINE

## 2024-11-20 RX ORDER — ASPIRIN 81 MG/1
324 TABLET, CHEWABLE ORAL ONCE
Status: COMPLETED | OUTPATIENT
Start: 2024-11-20 | End: 2024-11-20

## 2024-11-20 RX ADMIN — ASPIRIN 81 MG CHEWABLE TABLET 324 MG: 81 TABLET CHEWABLE at 16:00

## 2024-11-20 ASSESSMENT — ACTIVITIES OF DAILY LIVING (ADL)
ADLS_ACUITY_SCORE: 0

## 2024-11-20 ASSESSMENT — PAIN SCALES - GENERAL: PAINLEVEL_OUTOF10: MODERATE PAIN (5)

## 2024-11-20 NOTE — ED TRIAGE NOTES
Midsternal Chest pain since last night, pt reports some intermittent SOB.      Triage Assessment (Adult)       Row Name 11/20/24 1513          Triage Assessment    Airway WDL WDL        Cardiac WDL    Cardiac WDL --  Chest pain        Cognitive/Neuro/Behavioral WDL    Cognitive/Neuro/Behavioral WDL WDL

## 2024-11-20 NOTE — ED PROVIDER NOTES
"  Emergency Department Note      History of Present Illness     Chief Complaint   Chest Pain    HPI   Gualberto Farrell is a 39 year old male with a history of myopericarditis who presents for evaluation of chest pain. The patient reports that yesterday around 1730 while he was driving into work, he became short of breath, had palpitations, and some mid sternal chest pain all reminiscent of when he had pericarditis earlier this year. States that this lasted throughout the night and into this morning as well. He did have a follow up echocardiogram a few months after his admission but missed the appointment. He denies history of GERD. He denies abdominal pain, nausea, vomiting, diarrhea, constipation, lower extremity edema, and history of clot.     Independent Historian   None    Review of External Notes   I reviewed hospital discharge summary from 4/30/24 when he had been admitted for myopericarditis.     Past Medical History     Medical History and Problem List   Myopericarditis     Medications   The patient is currently on no regular medications.    Physical Exam     Patient Vitals for the past 24 hrs:   BP Temp Temp src Pulse Resp SpO2 Height Weight   11/20/24 1511 (!) 143/84 97.4  F (36.3  C) Oral 74 18 98 % 1.753 m (5' 9\") 102.5 kg (226 lb)     Physical Exam  General: Alert, appears well-developed and well-nourished. Cooperative.     In mild distress  HEENT:  Head:  Atraumatic  Ears:  External ears are normal  Mouth/Throat:  Oropharynx is without erythema or exudate and mucous membranes are moist.   Eyes:   Conjunctivae normal and EOM are normal. No scleral icterus.  CV:  Normal rate, regular rhythm, normal heart sounds and radial pulses are 2+ and symmetric.  No murmur.  Resp:  Breath sounds are clear bilaterally    Non-labored, no retractions or accessory muscle use  GI:  Abdomen is soft, no distension, no tenderness. No rebound or guarding.  No CVA tenderness bilaterally  MS:  Normal range of motion. No " edema.    Normal strength in all 4 extremities.  No chest wall pain to palpation/compression     Back atraumatic.    No midline cervical, thoracic, or lumbar tenderness  Skin:  Warm and dry.  No rash or lesions noted.  Neuro:   Alert. Normal strength.  GCS: 15  Psych: Normal mood and affect.    Diagnostics     Lab Results   Labs Ordered and Resulted from Time of ED Arrival to Time of ED Departure   COMPREHENSIVE METABOLIC PANEL - Normal       Result Value    Sodium 143      Potassium 3.7      Carbon Dioxide (CO2) 27      Anion Gap 10      Urea Nitrogen 12.7      Creatinine 0.80      GFR Estimate >90      Calcium 8.8      Chloride 106      Glucose 93      Alkaline Phosphatase 105      AST 29      ALT 44      Protein Total 6.9      Albumin 4.1      Bilirubin Total 0.3     TROPONIN T, HIGH SENSITIVITY - Normal    Troponin T, High Sensitivity 8     D DIMER QUANTITATIVE - Normal    D-Dimer Quantitative 0.29     TROPONIN T, HIGH SENSITIVITY - Normal    Troponin T, High Sensitivity 8     CBC WITH PLATELETS AND DIFFERENTIAL    WBC Count 7.0      RBC Count 5.08      Hemoglobin 14.9      Hematocrit 42.5      MCV 84      MCH 29.3      MCHC 35.1      RDW 13.6      Platelet Count 248      % Neutrophils 46      % Lymphocytes 40      % Monocytes 12      % Eosinophils 2      % Basophils 0      % Immature Granulocytes 0      NRBCs per 100 WBC 0      Absolute Neutrophils 3.2      Absolute Lymphocytes 2.8      Absolute Monocytes 0.8      Absolute Eosinophils 0.2      Absolute Basophils 0.0      Absolute Immature Granulocytes 0.0      Absolute NRBCs 0.0         Imaging   XR Chest 2 Views   Final Result   IMPRESSION: Negative chest.        EKG   ECG taken at 1509, ECG read at 1600  Normal sinus rhythm   Right bundle brach block    No change as compared to prior, dated 4/27/24.  Rate 68 bpm. NJ interval 158 ms. QRS duration 118 ms. QT/QTc 406/431 ms. P-R-T axes 39 265 36.    Independent Interpretation   CXR: No pneumothorax or  infiltrate.    ED Course      Medications Administered   Medications   aspirin (ASA) chewable tablet 324 mg (324 mg Oral $Given 11/20/24 1600)       Procedures   Procedures     Discussion of Management   None    ED Course   ED Course as of 11/20/24 1812 Wed Nov 20, 2024   1546 I obtained history and examined the patient as noted above.    1805 I rechecked and updated the patient.        Additional Documentation  None    Medical Decision Making / Diagnosis     CMS Diagnoses: None    MIPS       None    MDM   Gualberto Farrell is a 39 year old male with a history of myopericarditis who presents with chest pain.  The work up in the Emergency Department is negative.  I considered a broad differential diagnosis in this patient including life-threatening etiologies such as acute coronary syndrome, myocardial infarction, pulmonary embolism, acute aortic dissection, myocarditis, pericarditis, acute valvular insufficiency amongst others.  Other causes considered for this patient included pneumonia, pneumothorax, chest wall source, pericarditis, pleurisy, esophageal spasm, etc.  No serious etiology for the chest pain were detected today during this visit.  Close follow up with primary care is indicated should the pain continue, as further work up may be performed; this was made clear to the patient, who understands.  Patient did not follow-up for repeat echocardiogram 3 months after his prior hospitalization for myopericarditis.  I placed a repeat referral to cardiology so this can be coordinated in a close outpatient follow-up fashion.      Disposition   The patient was discharged.     Diagnosis     ICD-10-CM    1. Chest pain, unspecified type  R07.9 ED To Primary Care Referral     Follow-Up with Cardiology      2. Myopericarditis  I31.9 Follow-Up with Cardiology           Scribe Disclosure:  IJennifer, am serving as a scribe at 3:47 PM on 11/20/2024 to document services personally performed by Williams Woods MD  based on my observations and the provider's statements to me.        Williams Woods MD  11/20/24 0684

## 2024-11-20 NOTE — LETTER
2024    Gualberto Farrell   1985        To Whom it May Concern;    Please excuse Gualberto Farrell from work/school for a healthcare visit on 2024. He was accompanied by his wife Carley Montes as well.        Sincerely,        Aye Wilkerson, DNP, APRN, CNP

## 2024-11-20 NOTE — PROGRESS NOTES
"  Assessment & Plan     Atypical chest pain  New onset chest pain that just started yesterday. Notes the pains \"come and go.\" Describes it as a \"pulsing, pressure\" type of pain in the right side of his chest. Some dizziness/lightheadedness and fatigue. No SOB, nausea, diaphoresis. Lifting makes it worse, does not notice significant change in symptoms on exertion. Has not tried anything to help. No hx of reflux or GERD. Does not have PCP, no major notable health history although BP slightly elevated in clinic today. Did have hospitalization earlier this year for myopericarditis, ultimately did not follow-up with recommended MRI. Will perform in clinic EKG but ultimately recommended he go to ED for further evaluation. Patient and significant other agreeable. Feels he is stable and able to transport self to ER.  *ED called to notify of patient's arrival.   - REVIEW OF HEALTH MAINTENANCE PROTOCOL ORDERS  - EKG 12-lead complete w/read - Clinics          BMI  Estimated body mass index is 33.08 kg/m  as calculated from the following:    Height as of this encounter: 1.753 m (5' 9\").    Weight as of this encounter: 101.6 kg (224 lb).         CONSULTATION/REFERRAL to ER  FUTURE APPOINTMENTS:       - Follow-up for annual visit or as needed    Hanny Burciaga is a 39 year old, presenting for the following health issues:  Chest Pain    Chest pains started yesterday, they come and go.  Notes he was seen for a similar issue earlier this year, was diagnosed with myopericarditis  Has no recent illnesses  Describes pain as \"pulsing\" pressure in his right side of his chest. When pain comes, it is present for a couple minutes. Does not travel or radiate  No SOB, nausea, cough, HA.   Some dizziness/lightheadedness, has felt more tired this week  Has not tried anything to help pain  Lifting makes it worse (does heavy lifting at work). Does not feel it is MSK in nature  No hx of heartburn or reflux    History of Present Illness " "      Reason for visit:  Chest pain    He eats 0-1 servings of fruits and vegetables daily.He consumes 1 sweetened beverage(s) daily.He exercises with enough effort to increase his heart rate 20 to 29 minutes per day.  He exercises with enough effort to increase his heart rate 4 days per week.   He is taking medications regularly.                 Review of Systems  Constitutional, HEENT, cardiovascular, pulmonary, gi and gu systems are negative, except as otherwise noted.      Objective    BP (!) 130/90 (BP Location: Right arm, Patient Position: Sitting, Cuff Size: Adult Large)   Pulse 70   Temp 97.7  F (36.5  C) (Temporal)   Ht 1.753 m (5' 9\")   Wt 101.6 kg (224 lb)   SpO2 98%   BMI 33.08 kg/m    Body mass index is 33.08 kg/m .  Physical Exam  Vitals reviewed.   Constitutional:       General: He is not in acute distress.     Appearance: Normal appearance. He is not diaphoretic.   HENT:      Head: Normocephalic.   Eyes:      Pupils: Pupils are equal, round, and reactive to light.   Cardiovascular:      Rate and Rhythm: Normal rate.      Pulses: Normal pulses.      Heart sounds: Normal heart sounds. No murmur heard.     No friction rub. No gallop.   Pulmonary:      Effort: Pulmonary effort is normal. No respiratory distress.      Breath sounds: Normal breath sounds. No stridor. No wheezing, rhonchi or rales.   Musculoskeletal:         General: Normal range of motion.   Skin:     General: Skin is warm and dry.   Neurological:      General: No focal deficit present.      Mental Status: He is alert and oriented to person, place, and time.   Psychiatric:         Mood and Affect: Mood normal.         Behavior: Behavior normal.         Thought Content: Thought content normal.         Judgment: Judgment normal.            EKG - Reviewed and interpreted by me. Appears normal, NSR, changed from previous tracings, Possible right ventricular hypertrophy or posterior fascicular block, non-specific T-abnormality    "     Signed Electronically by: Aye Wilkerson, DNP, APRN, CNP

## 2024-12-08 ENCOUNTER — HEALTH MAINTENANCE LETTER (OUTPATIENT)
Age: 39
End: 2024-12-08

## 2025-06-05 ENCOUNTER — TELEPHONE (OUTPATIENT)
Dept: FAMILY MEDICINE | Facility: OTHER | Age: 40
End: 2025-06-05
Payer: COMMERCIAL

## 2025-06-05 NOTE — TELEPHONE ENCOUNTER
RN Triage    Patient Contact    Attempt # 1    Was call answered?  No.  Left message on voicemail with information to call me back.    ED visit for chest pain 11/20/24. Hx myopericarditis. No PCP on file. No follow-ups on chest pains or hospitalization seen on chart review.     Yas Nava RN on 6/5/2025 at 9:47 AM

## 2025-06-05 NOTE — TELEPHONE ENCOUNTER
Patient is scheduled next Tuesday 06/10 with JR for difficulty breathing at night, can you please triage this call?  Sapna Hampton, CMA

## 2025-06-06 NOTE — TELEPHONE ENCOUNTER
RN Triage    Patient Contact    Attempt # 2    RN did attempt to reach patient . No answer. Message left for patient  to call the clinic back and ask to speak to a member of the care team. Wanting to triage patient's symptoms for appointment on 6/10 as below.      Vickey Chong RN on 6/6/2025 at 11:03 AM

## 2025-06-09 NOTE — TELEPHONE ENCOUNTER
Attempt #3 to call.     RN has attempted to contact this patient by phone to return their call, but there is no response. RN left voicemail and requested return call to [facility] at [phone].     3 attempts have been made. Will route to provider as FYDYLAN.     Mylene Hyatt, LONN, RN

## 2025-06-10 ENCOUNTER — OFFICE VISIT (OUTPATIENT)
Dept: FAMILY MEDICINE | Facility: OTHER | Age: 40
End: 2025-06-10
Payer: COMMERCIAL

## 2025-06-10 VITALS
HEIGHT: 70 IN | DIASTOLIC BLOOD PRESSURE: 88 MMHG | HEART RATE: 72 BPM | TEMPERATURE: 97.8 F | SYSTOLIC BLOOD PRESSURE: 128 MMHG | WEIGHT: 226 LBS | OXYGEN SATURATION: 95 % | BODY MASS INDEX: 32.35 KG/M2 | RESPIRATION RATE: 18 BRPM

## 2025-06-10 DIAGNOSIS — K21.00 GASTROESOPHAGEAL REFLUX DISEASE WITH ESOPHAGITIS, UNSPECIFIED WHETHER HEMORRHAGE: ICD-10-CM

## 2025-06-10 DIAGNOSIS — R10.9 ABDOMINAL DISCOMFORT: ICD-10-CM

## 2025-06-10 DIAGNOSIS — R09.81 NASAL CONGESTION: Primary | ICD-10-CM

## 2025-06-10 DIAGNOSIS — R06.00 DYSPNEA, UNSPECIFIED TYPE: ICD-10-CM

## 2025-06-10 DIAGNOSIS — R09.81 NASAL CONGESTION: ICD-10-CM

## 2025-06-10 PROCEDURE — 3074F SYST BP LT 130 MM HG: CPT | Performed by: PHYSICIAN ASSISTANT

## 2025-06-10 PROCEDURE — 1126F AMNT PAIN NOTED NONE PRSNT: CPT | Performed by: PHYSICIAN ASSISTANT

## 2025-06-10 PROCEDURE — 99214 OFFICE O/P EST MOD 30 MIN: CPT | Performed by: PHYSICIAN ASSISTANT

## 2025-06-10 PROCEDURE — 3079F DIAST BP 80-89 MM HG: CPT | Performed by: PHYSICIAN ASSISTANT

## 2025-06-10 RX ORDER — FLUTICASONE PROPIONATE 50 MCG
1 SPRAY, SUSPENSION (ML) NASAL DAILY
Qty: 15.8 ML | Refills: 1 | Status: SHIPPED | OUTPATIENT
Start: 2025-06-10

## 2025-06-10 RX ORDER — OMEPRAZOLE 20 MG/1
CAPSULE, DELAYED RELEASE ORAL
Qty: 52 CAPSULE | Refills: 0 | Status: SHIPPED | OUTPATIENT
Start: 2025-06-10 | End: 2025-07-25

## 2025-06-10 ASSESSMENT — PAIN SCALES - GENERAL: PAINLEVEL_OUTOF10: NO PAIN (0)

## 2025-06-10 ASSESSMENT — PATIENT HEALTH QUESTIONNAIRE - PHQ9: SUM OF ALL RESPONSES TO PHQ QUESTIONS 1-9: 9

## 2025-06-10 NOTE — PROGRESS NOTES
Assessment & Plan     Nasal congestion  Patient reports congestion which is more noticeable at night. He says that he feels that it is difficult to breath through the nose which is affecting the quality of his sleep. I will have him begin use of flonase. Reviewed how to use this medication and typical timeframe for benefit. He will reach out if not improving.   - fluticasone (FLONASE) 50 MCG/ACT nasal spray; Spray 1 spray into both nostrils daily.    Gastroesophageal reflux disease with esophagitis, unspecified whether hemorrhage  Patient reports some heartburn. Unsure as to how frequent. We discussed that GERD can cause inflammation and SOB. I will have him complete 6 week taper of this over the next for this. He will let me know if he does not improve as expected.   - omeprazole (PRILOSEC) 20 MG DR capsule; Take 2 capsules (40 mg) by mouth daily for 15 days, THEN 1 capsule (20 mg) daily for 15 days, THEN 1 capsule (20 mg) every other day for 15 days.    Dyspnea, unspecified type  Patient reports feeling like he is not able to breath as easily through the throat at night. He also says that his sister told him that he gasps in his sleep. He was referred for sleep study in 2024, but had not been able to attend the appointment. He was agreeable to new referral today.   - Adult Sleep Eval & Management  Referral; Future    Abdominal discomfort  Patient reports that he has been experiencing pains in the abdomen when lifting heavier objects at work. When the pain occurs it will last hours and resolve on its own. He also notices that he will have larger bowel movements around these times. Unable to recreate tenderness on exam. Discussed likelihood of abdominal strain and recommended working on stretching/strengthening this area. Provided patient with examples of how to do this with his AVS today. I also will have patient use fiber supplement to reduce larger stools and facilitate easier BMs. If he is not  "improving as expected he will reach out to let me know.   - methylcellulose (CITRUCEL) powder; Mix 1 spoonful into 8oz glass and drink once daily as needed    BMI  Estimated body mass index is 32.54 kg/m  as calculated from the following:    Height as of this encounter: 1.775 m (5' 9.88\").    Weight as of this encounter: 102.5 kg (226 lb).   Weight management plan: Discussed healthy diet and exercise guidelines    Hanny Burciaga is a 39 year old, presenting for the following health issues:  Consult (Breathing issues at night)      6/10/2025     1:13 PM   Additional Questions   Roomed by Hafsa   Accompanied by Self         6/10/2025   Forms   Any forms needing to be completed Yes         6/10/2025     1:13 PM   Patient Reported Additional Medications   Patient reports taking the following new medications NA     History of Present Illness       Reason for visit:  Having a hard time breathing at night  Symptom onset:  3-7 days ago  Symptoms include:  Having trouble breathing at night when sleeping/stomach pain when lifting heavy objects  Symptom intensity:  Moderate  Symptom progression:  Worsening  Had these symptoms before:  No  What makes it worse:  No  What makes it better:  No   He is taking medications regularly.          Review of Systems  Constitutional, HEENT, cardiovascular, pulmonary, gi and gu systems are negative, except as otherwise noted.      Objective    /88   Pulse 72   Temp 97.8  F (36.6  C) (Temporal)   Resp 18   Ht 1.775 m (5' 9.88\")   Wt 102.5 kg (226 lb)   SpO2 95%   BMI 32.54 kg/m    Body mass index is 32.54 kg/m .  Physical Exam   GENERAL: alert, no distress, and obese  EYES: Eyes grossly normal to inspection, PERRL and conjunctivae and sclerae normal  HENT: normal cephalic/atraumatic, right ear: normal: no effusions, no erythema, normal landmarks, left ear: occluded with wax, nasal mucosa edematous , oropharynx clear, and oral mucous membranes moist  NECK: no adenopathy, no " asymmetry, masses, or scars  RESP: lungs clear to auscultation - no rales, rhonchi or wheezes  CV: regular rate and rhythm, normal S1 S2, no S3 or S4, no murmur, click or rub, no peripheral edema  ABDOMEN: soft, nontender, no hepatosplenomegaly, no masses and bowel sounds normal  MS: no gross musculoskeletal defects noted, no edema  NEURO: Normal strength and tone, mentation intact and speech normal  PSYCH: mentation appears normal, affect normal/bright          Signed Electronically by: Jay Lewis PA-C      Patient identified using two patient identifiers.  Ear exam showing wax occlusion completed by provider.  H202/H20 was placed in the left ear(s) via irrigation tool: elephant ear. Wax cleared. Sapna Hampton, CMA

## 2025-06-11 ENCOUNTER — PATIENT OUTREACH (OUTPATIENT)
Dept: CARE COORDINATION | Facility: CLINIC | Age: 40
End: 2025-06-11
Payer: COMMERCIAL

## 2025-06-11 RX ORDER — FLUTICASONE PROPIONATE 50 MCG
1 SPRAY, SUSPENSION (ML) NASAL DAILY
Qty: 32 G | OUTPATIENT
Start: 2025-06-11